# Patient Record
Sex: MALE | Race: OTHER | HISPANIC OR LATINO | Employment: UNEMPLOYED | URBAN - METROPOLITAN AREA
[De-identification: names, ages, dates, MRNs, and addresses within clinical notes are randomized per-mention and may not be internally consistent; named-entity substitution may affect disease eponyms.]

---

## 2023-05-22 ENCOUNTER — HOSPITAL ENCOUNTER (EMERGENCY)
Facility: HOSPITAL | Age: 53
Discharge: HOME | End: 2023-05-22
Attending: INTERNAL MEDICINE

## 2023-05-22 ENCOUNTER — APPOINTMENT (EMERGENCY)
Dept: RADIOLOGY | Facility: HOSPITAL | Age: 53
End: 2023-05-22

## 2023-05-22 VITALS
OXYGEN SATURATION: 100 % | WEIGHT: 201.5 LBS | HEART RATE: 74 BPM | SYSTOLIC BLOOD PRESSURE: 142 MMHG | RESPIRATION RATE: 20 BRPM | DIASTOLIC BLOOD PRESSURE: 87 MMHG | TEMPERATURE: 98.1 F

## 2023-05-22 DIAGNOSIS — M17.12 ARTHRITIS OF LEFT KNEE: Primary | ICD-10-CM

## 2023-05-22 RX ORDER — SENNOSIDES 8.6 MG
650 CAPSULE ORAL EVERY 8 HOURS PRN
Qty: 21 TABLET | Refills: 0 | Status: SHIPPED | OUTPATIENT
Start: 2023-05-22 | End: 2023-05-29

## 2023-05-22 RX ORDER — NAPROXEN 500 MG/1
500 TABLET ORAL 2 TIMES DAILY WITH MEALS
Qty: 14 TABLET | Refills: 0 | Status: SHIPPED | OUTPATIENT
Start: 2023-05-22 | End: 2023-05-29

## 2023-05-22 NOTE — ED ATTENDING ATTESTATION
5/22/2023  ILibby MD, saw and evaluated the patient  I have discussed the patient with the resident/non-physician practitioner and agree with the resident's/non-physician practitioner's findings, Plan of Care, and MDM as documented in the resident's/non-physician practitioner's note, except where noted  All available labs and Radiology studies were reviewed  I was present for key portions of any procedure(s) performed by the resident/non-physician practitioner and I was immediately available to provide assistance  At this point I agree with the current assessment done in the Emergency Department  I have conducted an independent evaluation of this patient a history and physical is as follows:    ED Course   70-year-old male presents to ED for evaluation of left knee pain  Pain has been present for a few weeks  Worse with prolonged standing or walking  No falls or trauma  Has not tried any medications or therapies  Needs a note stating he cannot walk far distances  No fevers, no redness  On exam the patient is awake, alert, and comfortable  Mucous membranes are moist   Neck supple and nontender  The patient's heart is regular without murmurs, rubs, or gallops  Lungs are clear bilaterally with good air movement  Abdomen soft, no voluntary guarding, rebound or rigidity  Moves all extremities  Left knee with full ROM, no effusion, no crepitus, no erythema  Patient neurologically nonfocal  No skin rashes  Back without deformities  Medical decision making: Differential diagnosis includes osteoarthritis, tendon injury, ligament injury, knee effusion, soft tissue injury, will obtain x-ray to rule out acute fracture or dislocation    Discussed supportive treatment      Critical Care Time  Procedures

## 2023-05-22 NOTE — ED PROVIDER NOTES
History  Chief Complaint   Patient presents with   • Knee Pain     Left knee  He is in [employer] and he is required to walk and do projects  His leg got worse and he uses his cane  Needs a note for limitations  76-year-old male with history of substance abuse, left-sided osteoarthritis of his knee presented to emergency department for evaluation of knee pain  Patient reports that he is currently at a rehab facility  This rehab facility makes him do multiple tasks requiring moving around  Pt reports that for at least 1 wk he has had worsening L knee pain  He denies fevers, redness, posterior leg pain  He knows he has a hx of arthritis and is presenting requesting a note telling his facility that he doesn't need to perform the tasks they have been asking him to  Patient is otherwise well and without complaint  None       Past Medical History:   Diagnosis Date   • Stroke [facility])        History reviewed  No pertinent surgical history  History reviewed  No pertinent family history  I have reviewed and agree with the history as documented  E-Cigarette/Vaping     E-Cigarette/Vaping Substances     Social History     Tobacco Use   • Smoking status: Every Day     Packs/day: 1 00     Types: Cigarettes   • Smokeless tobacco: Never   Substance Use Topics   • Alcohol use: Not Currently   • Drug use: Not Currently        Review of Systems   Constitutional: Negative  HENT: Negative  Eyes: Negative  Respiratory: Negative  Cardiovascular: Negative  Gastrointestinal: Negative  Endocrine: Negative  Genitourinary: Negative  Musculoskeletal: Positive for joint swelling  L knee pain   Skin: Negative  Allergic/Immunologic: Negative  Neurological: Negative  Hematological: Negative  Psychiatric/Behavioral: Negative  All other systems reviewed and are negative        Physical Exam  ED Triage Vitals   Temperature Pulse Respirations Blood Pressure SpO2   05/22/23 1000 05/22/23 1000 05/22/23 1000 05/22/23 1000 05/22/23 1000   98 1 °F (36 7 °C) 74 20 142/87 100 %      Temp Source Heart Rate Source Patient Position - Orthostatic VS BP Location FiO2 (%)   05/22/23 1000 05/22/23 1000 05/22/23 1000 05/22/23 1000 --   Oral Monitor Sitting Left arm       Pain Score       05/22/23 1121       8             Orthostatic Vital Signs  Vitals:    05/22/23 1000   BP: 142/87   Pulse: 74   Patient Position - Orthostatic VS: Sitting       Physical Exam  Vitals and nursing note reviewed  Constitutional:       General: He is not in acute distress  Appearance: Normal appearance  He is not ill-appearing, toxic-appearing or diaphoretic  HENT:      Head: Normocephalic and atraumatic  Eyes:      General: No scleral icterus  Right eye: No discharge  Left eye: No discharge  Extraocular Movements: Extraocular movements intact  Conjunctiva/sclera: Conjunctivae normal       Pupils: Pupils are equal, round, and reactive to light  Cardiovascular:      Rate and Rhythm: Normal rate  Pulses: Normal pulses  Heart sounds: Normal heart sounds  No murmur heard  No friction rub  No gallop  Pulmonary:      Effort: Pulmonary effort is normal  No respiratory distress  Breath sounds: Normal breath sounds  No stridor  No wheezing, rhonchi or rales  Abdominal:      General: Abdomen is flat  Bowel sounds are normal  There is no distension  Palpations: Abdomen is soft  Tenderness: There is no abdominal tenderness  There is no guarding or rebound  Musculoskeletal:         General: Tenderness present  No swelling  Normal range of motion  Cervical back: Normal range of motion  No rigidity  Right lower leg: No edema  Left lower leg: No edema  Comments: Minor L knee swelling  Pain w/ ROM, no popliteal pain  No overlying erythema  Skin:     General: Skin is warm and dry  Capillary Refill: Capillary refill takes less than 2 seconds  Coloration: Skin is not jaundiced  Findings: No bruising or lesion  Neurological:      General: No focal deficit present  Mental Status: He is alert and oriented to person, place, and time  Mental status is at baseline  Psychiatric:         Mood and Affect: Mood normal          Behavior: Behavior normal          Thought Content: Thought content normal          Judgment: Judgment normal          ED Medications  Medications - No data to display    Diagnostic Studies  Results Reviewed     None                 XR knee 3 views left non injury   Final Result by Lizett Fairchild MD (05/22 1504)      No acute osseous abnormality  Moderate tricompartmental osteoarthritis as evidenced by medial compartment joint space narrowing, as well as osteophyte formation and subchondral sclerosis  Workstation performed: [id number]               Procedures  Procedures      ED Course                             SBIRT 20yo+    Flowsheet Row Most Recent Value   Initial Alcohol Screen: US AUDIT-C     1  How often do you have a drink containing alcohol? 0 Filed at: 05/22/2023 1002   2  How many drinks containing alcohol do you have on a typical day you are drinking? 0 Filed at: 05/22/2023 1002   3a  Male UNDER 65: How often do you have five or more drinks on one occasion? 0 Filed at: 05/22/2023 1002   3b  FEMALE Any Age, or MALE 65+: How often do you have 4 or more drinks on one occassion? 0 Filed at: 05/22/2023 1002   Audit-C Score 0 Filed at: 05/22/2023 1002   AELXA: How many times in the past year have you    Used an illegal drug or used a prescription medication for non-medical reasons? Never Filed at: 05/22/2023 1002                Medical Decision Making  48-year-old male presenting to the emergency department for evaluation knee pain  Physical exam findings as noted above    Differential for knee pain includes infectious etiology i e  septic arthritis I do not believe this patient has on account of lack of overlying erythema and normal vital signs  X-ray of the knee demonstrates marked joint line narrowing consistent with prior diagnosis of osteoarthritis  Suspect that patient's pain is secondary to arthritis flare  Will recommend anti-inflammatories with Tylenol as needed for a week with orthopedic surgery follow-up  Return precautions given, patient discharged  Amount and/or Complexity of Data Reviewed  Radiology: ordered  Risk  OTC drugs  Prescription drug management  Disposition  Final diagnoses:   Arthritis of left knee     Time reflects when diagnosis was documented in both MDM as applicable and the Disposition within this note     Time User Action Codes Description Comment    5/22/2023 11:06 AM Jorje Mueller Add [M17 12] Arthritis of left knee       ED Disposition     ED Disposition   Discharge    Condition   Stable    Date/Time   Mon May 22, 2023 11:06 AM    Nedra Velasquez 89 discharge to home/self care  Follow-up Information     Follow up With Specialties Details Why Contact Info Additional Information    [address] [facility] Call in 1 day  [address]  [id number]  [address] [place] [address], [id number] [phone]          There are no discharge medications for this patient  PDMP Review     None           ED Provider  Attending physically available and evaluated Marisa Penaloza I managed the patient along with the ED Attending      Electronically Signed by         Marina Echols DO  05/22/23 1660

## 2023-05-22 NOTE — Clinical Note
Kenya Areans was seen and treated in our emergency department on 5/22/2023  No work until cleared by Family Doctor/Orthopedics        Diagnosis:     Cristiano Restrepo    He may return on this date: If you have any questions or concerns, please don't hesitate to call        Scotty Bell, DO    ______________________________           _______________          _______________  Hospital Representative                              Date                                Time

## 2023-06-06 ENCOUNTER — OFFICE VISIT (OUTPATIENT)
Dept: FAMILY MEDICINE CLINIC | Facility: CLINIC | Age: 53
End: 2023-06-06

## 2023-06-06 ENCOUNTER — PATIENT OUTREACH (OUTPATIENT)
Dept: FAMILY MEDICINE CLINIC | Facility: CLINIC | Age: 53
End: 2023-06-06

## 2023-06-06 ENCOUNTER — TELEPHONE (OUTPATIENT)
Dept: PSYCHIATRY | Facility: CLINIC | Age: 53
End: 2023-06-06

## 2023-06-06 VITALS
OXYGEN SATURATION: 97 % | HEIGHT: 69 IN | DIASTOLIC BLOOD PRESSURE: 80 MMHG | SYSTOLIC BLOOD PRESSURE: 140 MMHG | HEART RATE: 78 BPM | RESPIRATION RATE: 16 BRPM | TEMPERATURE: 98.3 F | WEIGHT: 205 LBS | BODY MASS INDEX: 30.36 KG/M2

## 2023-06-06 DIAGNOSIS — Z76.89 ENCOUNTER TO ESTABLISH CARE: Primary | ICD-10-CM

## 2023-06-06 DIAGNOSIS — Z76.0 MEDICATION REFILL: ICD-10-CM

## 2023-06-06 DIAGNOSIS — F25.8 OTHER SCHIZOAFFECTIVE DISORDERS (HCC): ICD-10-CM

## 2023-06-06 DIAGNOSIS — M17.12 ARTHRITIS OF LEFT KNEE: ICD-10-CM

## 2023-06-06 DIAGNOSIS — Z78.9 NEEDS ASSISTANCE WITH COMMUNITY RESOURCES: Primary | ICD-10-CM

## 2023-06-06 DIAGNOSIS — D18.00 CAVERNOUS ANGIOMA: ICD-10-CM

## 2023-06-06 DIAGNOSIS — Z91.89 ENCOUNTER FOR HEPATITIS C VIRUS SCREENING TEST FOR HIGH RISK PATIENT: ICD-10-CM

## 2023-06-06 DIAGNOSIS — I63.9 CEREBROVASCULAR ACCIDENT (CVA), UNSPECIFIED MECHANISM (HCC): ICD-10-CM

## 2023-06-06 DIAGNOSIS — Z11.4 ENCOUNTER FOR SCREENING FOR HIV: ICD-10-CM

## 2023-06-06 DIAGNOSIS — Z59.82 INABILITY TO ACQUIRE TRANSPORTATION: ICD-10-CM

## 2023-06-06 DIAGNOSIS — Z11.59 ENCOUNTER FOR HEPATITIS C VIRUS SCREENING TEST FOR HIGH RISK PATIENT: ICD-10-CM

## 2023-06-06 DIAGNOSIS — R53.1 RIGHT SIDED WEAKNESS: ICD-10-CM

## 2023-06-06 DIAGNOSIS — F11.259 OPIOID DEPENDENCE WITH OPIOID-INDUCED PSYCHOTIC DISORDER WITH COMPLICATION (HCC): ICD-10-CM

## 2023-06-06 DIAGNOSIS — Z74.8 ASSISTANCE WITH TRANSPORTATION: ICD-10-CM

## 2023-06-06 DIAGNOSIS — Z59.9 FINANCIAL DIFFICULTIES: ICD-10-CM

## 2023-06-06 DIAGNOSIS — I10 PRIMARY HYPERTENSION: ICD-10-CM

## 2023-06-06 DIAGNOSIS — Z59.41 FOOD INSECURITY: ICD-10-CM

## 2023-06-06 DIAGNOSIS — E78.2 MIXED HYPERLIPIDEMIA: ICD-10-CM

## 2023-06-06 PROBLEM — M62.82 DISEASE CHARACTERIZED BY DESTRUCTION OF SKELETAL MUSCLE: Status: ACTIVE | Noted: 2023-01-20

## 2023-06-06 PROBLEM — R42 DIZZINESS: Status: ACTIVE | Noted: 2023-06-06

## 2023-06-06 PROBLEM — F11.20 OPIATE DEPENDENCE (HCC): Status: ACTIVE | Noted: 2021-07-11

## 2023-06-06 PROBLEM — R51.9 NOCTURNAL HEADACHES: Status: ACTIVE | Noted: 2022-05-05

## 2023-06-06 PROBLEM — R25.2 SPASM: Status: ACTIVE | Noted: 2022-05-05

## 2023-06-06 PROBLEM — B18.2 HEP C W/O COMA, CHRONIC (HCC): Status: ACTIVE | Noted: 2018-10-04

## 2023-06-06 PROBLEM — K26.5 DUODENAL ULCER PERFORATION (HCC): Status: ACTIVE | Noted: 2020-03-09

## 2023-06-06 PROCEDURE — 99205 OFFICE O/P NEW HI 60 MIN: CPT

## 2023-06-06 RX ORDER — CLONIDINE HYDROCHLORIDE 0.1 MG/1
0.1 TABLET ORAL DAILY
Qty: 90 TABLET | Refills: 0 | Status: SHIPPED | OUTPATIENT
Start: 2023-06-06 | End: 2023-09-04

## 2023-06-06 RX ORDER — HYDROXYZINE 50 MG/1
50 TABLET, FILM COATED ORAL 3 TIMES DAILY PRN
COMMUNITY
End: 2023-06-06 | Stop reason: SDUPTHER

## 2023-06-06 RX ORDER — DOCUSATE SODIUM 100 MG/1
100 CAPSULE, LIQUID FILLED ORAL 2 TIMES DAILY PRN
Qty: 180 CAPSULE | Refills: 0 | Status: SHIPPED | OUTPATIENT
Start: 2023-06-06 | End: 2023-09-04

## 2023-06-06 RX ORDER — DOXEPIN HYDROCHLORIDE 50 MG/1
CAPSULE ORAL
COMMUNITY
Start: 2023-04-20 | End: 2023-06-06 | Stop reason: SDUPTHER

## 2023-06-06 RX ORDER — PROCHLORPERAZINE MALEATE 5 MG/1
5 TABLET ORAL EVERY 6 HOURS PRN
COMMUNITY

## 2023-06-06 RX ORDER — ACETAMINOPHEN 325 MG/1
650 TABLET ORAL EVERY 6 HOURS PRN
Qty: 90 TABLET | Refills: 2 | Status: SHIPPED | OUTPATIENT
Start: 2023-06-06 | End: 2023-09-04

## 2023-06-06 RX ORDER — DICYCLOMINE HYDROCHLORIDE 10 MG/5ML
20 SOLUTION ORAL
COMMUNITY

## 2023-06-06 RX ORDER — ESCITALOPRAM OXALATE 20 MG/1
TABLET ORAL
COMMUNITY
Start: 2023-04-11 | End: 2023-06-06 | Stop reason: SDUPTHER

## 2023-06-06 RX ORDER — HYDROXYZINE 50 MG/1
50 TABLET, FILM COATED ORAL 3 TIMES DAILY PRN
Qty: 90 TABLET | Refills: 0 | Status: SHIPPED | OUTPATIENT
Start: 2023-06-06 | End: 2023-09-04

## 2023-06-06 RX ORDER — ATORVASTATIN CALCIUM 10 MG/1
10 TABLET, FILM COATED ORAL DAILY
Qty: 90 TABLET | Refills: 0 | Status: SHIPPED | OUTPATIENT
Start: 2023-06-06 | End: 2023-09-04

## 2023-06-06 RX ORDER — ASPIRIN 81 MG/1
81 TABLET, COATED ORAL DAILY
Qty: 90 TABLET | Refills: 0 | Status: SHIPPED | OUTPATIENT
Start: 2023-06-06 | End: 2023-09-04

## 2023-06-06 RX ORDER — DOXEPIN HYDROCHLORIDE 50 MG/1
50 CAPSULE ORAL DAILY
Qty: 90 CAPSULE | Refills: 0 | Status: SHIPPED | OUTPATIENT
Start: 2023-06-06 | End: 2023-09-04

## 2023-06-06 RX ORDER — HYDROCHLOROTHIAZIDE 12.5 MG/1
12.5 CAPSULE, GELATIN COATED ORAL DAILY
COMMUNITY
End: 2023-06-06

## 2023-06-06 RX ORDER — FUROSEMIDE 20 MG/1
20 TABLET ORAL 2 TIMES DAILY
COMMUNITY
End: 2023-06-06 | Stop reason: SDUPTHER

## 2023-06-06 RX ORDER — ATORVASTATIN CALCIUM 10 MG/1
TABLET, FILM COATED ORAL
COMMUNITY
Start: 2023-04-20 | End: 2023-06-06 | Stop reason: SDUPTHER

## 2023-06-06 RX ORDER — DOCUSATE SODIUM 100 MG/1
CAPSULE, LIQUID FILLED ORAL
COMMUNITY
Start: 2023-03-01 | End: 2023-06-06 | Stop reason: SDUPTHER

## 2023-06-06 RX ORDER — TRIFLUOPERAZINE HYDROCHLORIDE 2 MG/1
TABLET, FILM COATED ORAL
COMMUNITY
Start: 2023-04-14 | End: 2023-06-06 | Stop reason: SDUPTHER

## 2023-06-06 RX ORDER — QUETIAPINE FUMARATE 200 MG/1
TABLET, FILM COATED ORAL
COMMUNITY
Start: 2023-03-01 | End: 2023-06-06 | Stop reason: SDUPTHER

## 2023-06-06 RX ORDER — CLONIDINE HYDROCHLORIDE 0.1 MG/1
TABLET ORAL
COMMUNITY
Start: 2023-04-18 | End: 2023-06-06 | Stop reason: SDUPTHER

## 2023-06-06 RX ORDER — ACETAMINOPHEN 325 MG/1
650 TABLET ORAL EVERY 6 HOURS PRN
COMMUNITY
End: 2023-06-06 | Stop reason: SDUPTHER

## 2023-06-06 RX ORDER — DIVALPROEX SODIUM 500 MG/1
500 TABLET, DELAYED RELEASE ORAL EVERY 8 HOURS SCHEDULED
COMMUNITY

## 2023-06-06 RX ORDER — ESCITALOPRAM OXALATE 20 MG/1
20 TABLET ORAL DAILY
Qty: 90 TABLET | Refills: 0 | Status: SHIPPED | OUTPATIENT
Start: 2023-06-06 | End: 2023-09-04

## 2023-06-06 RX ORDER — QUETIAPINE FUMARATE 200 MG/1
200 TABLET, FILM COATED ORAL DAILY
Qty: 90 TABLET | Refills: 0 | Status: SHIPPED | OUTPATIENT
Start: 2023-06-06 | End: 2023-09-04

## 2023-06-06 RX ORDER — NALOXONE HYDROCHLORIDE 4 MG/.1ML
1 SPRAY NASAL
Qty: 2 EACH | Refills: 2 | Status: SHIPPED | OUTPATIENT
Start: 2023-06-06

## 2023-06-06 RX ORDER — DIVALPROEX SODIUM 500 MG/1
500 TABLET, EXTENDED RELEASE ORAL DAILY
Qty: 90 TABLET | Refills: 0 | Status: SHIPPED | OUTPATIENT
Start: 2023-06-06 | End: 2023-09-04

## 2023-06-06 RX ORDER — ASPIRIN 81 MG/1
TABLET, COATED ORAL
COMMUNITY
Start: 2023-04-11 | End: 2023-06-06 | Stop reason: SDUPTHER

## 2023-06-06 RX ORDER — HYDROCHLOROTHIAZIDE 12.5 MG/1
12.5 CAPSULE, GELATIN COATED ORAL DAILY
Qty: 90 CAPSULE | Refills: 0 | Status: SHIPPED | OUTPATIENT
Start: 2023-06-06 | End: 2023-09-04

## 2023-06-06 RX ORDER — DIVALPROEX SODIUM 500 MG/1
TABLET, EXTENDED RELEASE ORAL
COMMUNITY
Start: 2023-05-01 | End: 2023-06-06 | Stop reason: SDUPTHER

## 2023-06-06 RX ORDER — QUETIAPINE FUMARATE 300 MG/1
TABLET, FILM COATED ORAL
COMMUNITY
Start: 2023-04-28 | End: 2023-06-06 | Stop reason: SDUPTHER

## 2023-06-06 RX ORDER — IBUPROFEN 600 MG/1
TABLET ORAL EVERY 6 HOURS PRN
COMMUNITY

## 2023-06-06 RX ORDER — FUROSEMIDE 20 MG/1
20 TABLET ORAL 2 TIMES DAILY
Qty: 180 TABLET | Refills: 0 | Status: SHIPPED | OUTPATIENT
Start: 2023-06-06 | End: 2023-09-04

## 2023-06-06 RX ORDER — QUETIAPINE FUMARATE 300 MG/1
300 TABLET, FILM COATED ORAL
Qty: 90 TABLET | Refills: 0 | Status: SHIPPED | OUTPATIENT
Start: 2023-06-06 | End: 2023-09-04

## 2023-06-06 RX ORDER — NALOXONE HYDROCHLORIDE 4 MG/.1ML
1 SPRAY NASAL
COMMUNITY
Start: 2023-01-22 | End: 2023-06-06 | Stop reason: SDUPTHER

## 2023-06-06 RX ORDER — TRIFLUOPERAZINE HYDROCHLORIDE 2 MG/1
2 TABLET, FILM COATED ORAL DAILY
Qty: 90 TABLET | Refills: 0 | Status: SHIPPED | OUTPATIENT
Start: 2023-06-06 | End: 2023-09-04

## 2023-06-06 RX ORDER — HYDROCHLOROTHIAZIDE 12.5 MG/1
CAPSULE, GELATIN COATED ORAL
COMMUNITY
Start: 2023-04-23 | End: 2023-06-06 | Stop reason: SDUPTHER

## 2023-06-06 SDOH — ECONOMIC STABILITY - INCOME SECURITY: PROBLEM RELATED TO HOUSING AND ECONOMIC CIRCUMSTANCES, UNSPECIFIED: Z59.9

## 2023-06-06 SDOH — ECONOMIC STABILITY - TRANSPORTATION SECURITY: TRANSPORTATION INSECURITY: Z59.82

## 2023-06-06 SDOH — ECONOMIC STABILITY - FOOD INSECURITY: FOOD INSECURITY: Z59.41

## 2023-06-06 NOTE — ASSESSMENT & PLAN NOTE
Ct brain 1/2023  FINDINGS:   BRAIN PARENCHYMA: No acute hemorrhage  No mass effect or herniation  Gray-white differentiation is maintained  White matter is within normal limits for age  Redemonstration of partially calcified 7 mm lesion in the left hypothalamus previously   demonstrated to be a cavernoma  VENTRICLES/EXTRA-AXIAL SPACES: No hydrocephalus or extra-axial fluid collections  EXTRACRANIAL STRUCTURES: Normal bones and soft tissues  Visualized paranasal sinuses and mastoids are clear  IMPRESSION:     1  No acute intracranial abnormality  2  Stable left hypothalamic mass consistent with cavernoma on prior imaging studies        amb ref to neurology - future

## 2023-06-06 NOTE — PROGRESS NOTES
Name: Alix Mendoza      : 1970      MRN: [id number]  Encounter Provider: ROHINI Russell  Encounter Date: 2023   Encounter department: [address] [place] [address]     1  Encounter to establish care    2  Financial difficulties  Comments:  homeless  Assessment & Plan:  Referral placed for social work- which spoke to patient prior to discharge to provided patient with resources  Orders:  -     Ambulatory referral to social work care management program; Future; Expected date: 2023  -     Ambulatory Referral to Social Work Care Management Program; Future    3  Inability to acquire transportation  -     Ambulatory referral to social work care management program; Future; Expected date: 2023    4  Food insecurity  -     Ambulatory referral to social work care management program; Future; Expected date: 2023  -     Ambulatory Referral to Social Work Care Management Program; Future    5  Cerebrovascular accident (CVA), unspecified mechanism ([facility] [address] )  Assessment & Plan:  History of stroke with residual right sided weakness, reports following with neurology last visit two months ago  MRI 2021  FINDINGS: Ventricles and sulci are mildly prominent in keeping with   mild cerebral volume loss  There is mild periventricular T-2/flair   white matter hypoattenuation as well as a few punctate scattered foci   of hyperintensity in the right frontal white matter which are   nonspecific  No extra axial collection  No mass effect or edema   No   acute infarction seen on the diffusion sequence   No evidence of   hemorrhage on the susceptibility sequence  Again seen is a T1/T2   hypointense focus with associated susceptibility artifact along the   inferomedial margin of the left hypothalamus, consistent with   previously reported cavernous malformation  The major vessel flow voids are preserved at the skull base     Cerebellar tonsils are in normal location  The sella is not expanded  No gross orbital mass  Visualized paranasal sinuses and mastoids are   normal in signal  Bone marrow signal is unremarkable  IMPRESSION:     1   No intracranial mass effect, parenchymal hemorrhage, or evidence   of acute infarct  2   Stable appearance of left hypothalamic cavernous malformation  Continue with ASA   Amb ref to neurology - future  Orders:  -     Aspirin Low Dose 81 MG EC tablet; Take 1 tablet (81 mg total) by mouth daily  -     Ambulatory Referral to Neurology; Future    6  Arthritis of left knee  -     acetaminophen (TYLENOL) 325 mg tablet; Take 2 tablets (650 mg total) by mouth every 6 (six) hours as needed for severe pain  -     diclofenac sodium (VOLTAREN) 50 mg EC tablet; Take 1 tablet (50 mg total) by mouth 2 (two) times a day    7  Medication refill  -     docusate sodium (COLACE) 100 mg capsule; Take 1 capsule (100 mg total) by mouth 2 (two) times a day as needed for constipation    8  BMI 30 0-30 9,adult  -     CBC and differential; Future  -     Comprehensive metabolic panel; Future  -     Hemoglobin A1C; Future  -     Lipid panel; Future  -     TSH, 3rd generation with Free T4 reflex; Future  -     Ambulatory Referral to Cardiology; Future  -     CBC and differential  -     Comprehensive metabolic panel  -     Lipid panel  -     TSH, 3rd generation with Free T4 reflex    9  Opioid dependence with opioid-induced psychotic disorder with complication [facility])  Assessment & Plan:  Patient denies current use, currently staying at [facility] unsure for how long   amb ref for addiction services     Orders:  -     naloxone (NARCAN) 4 mg/0 1 mL nasal spray; 0 1 mL (4 mg total) into each nostril every 3 (three) minutes as needed for opioid reversal or respiratory depression  -     Ambulatory referral to Addiction Services; Future    10   Other schizoaffective disorders ([facility] [address] )  Assessment & Plan:  Patient on multiple psychiatric medications patient unsure of medications he is on   amb ref to psych - future    Orders:  -     trifluoperazine (STELAZINE) 2 mg tablet; Take 1 tablet (2 mg total) by mouth in the morning  -     QUEtiapine (SEROquel) 300 mg tablet; Take 1 tablet (300 mg total) by mouth daily at bedtime  -     QUEtiapine (SEROquel) 200 mg tablet; Take 1 tablet (200 mg total) by mouth in the morning  -     hydrOXYzine HCL (ATARAX) 50 mg tablet; Take 1 tablet (50 mg total) by mouth 3 (three) times a day as needed for anxiety  -     escitalopram (LEXAPRO) 20 mg tablet; Take 1 tablet (20 mg total) by mouth daily  -     doxepin (SINEquan) 50 mg capsule; Take 1 capsule (50 mg total) by mouth in the morning  -     divalproex sodium (DEPAKOTE ER) 500 mg 24 hr tablet; Take 1 tablet (500 mg total) by mouth daily  -     cloNIDine (CATAPRES) 0 1 mg tablet; Take 1 tablet (0 1 mg total) by mouth in the morning  -     Ambulatory referral to Addiction Services; Future  -     Ambulatory Referral to Psychiatry; Future    11  Encounter for screening for HIV  -     : HIV 1/2 AB/AG w Reflex [facility] for 2 yr old and above; Future    12  Encounter for hepatitis C virus screening test for high risk patient  -     Hepatitis C antibody; Future    13  Right sided weakness  Assessment & Plan:  Was receiving PT in [place]  Referral to pt/ot for evaluation and further therapy     Orders:  -     Ambulatory Referral to PT/OT Functional Capacity Evaluation; Future    14  Cavernous angioma  Assessment & Plan:  Ct brain 1/2023  FINDINGS:   BRAIN PARENCHYMA: No acute hemorrhage  No mass effect or herniation  Gray-white differentiation is maintained  White matter is within normal limits for age  Redemonstration of partially calcified 7 mm lesion in the left hypothalamus previously   demonstrated to be a cavernoma  VENTRICLES/EXTRA-AXIAL SPACES: No hydrocephalus or extra-axial fluid collections  EXTRACRANIAL STRUCTURES: Normal bones and soft tissues   Visualized paranasal sinuses and mastoids are clear  IMPRESSION:     1  No acute intracranial abnormality  2  Stable left hypothalamic mass consistent with cavernoma on prior imaging studies  amb ref to neurology - future    Orders:  -     Ambulatory Referral to Neurology; Future    15  Primary hypertension  Assessment & Plan:  BP Readings from Last 3 Encounters:   06/06/23 140/80   05/22/23 142/87     - Blood pressure currently well controlled with current antihypertensive therapy Lasix 20 mg bid, hydrochlorothiazide 12 5   - No complaints of adverse effects, including visual changes, dizziness, headaches or syncope  - continue current therapy  - Encouraged continued use of home blood pressure logs  - Encouraged diet and exercise regimen as tolerated  -ECG-future   amb ref to cardiology      Orders:  -     hydrochlorothiazide (MICROZIDE) 12 5 mg capsule; Take 1 capsule (12 5 mg total) by mouth in the morning  -     furosemide (LASIX) 20 mg tablet; Take 1 tablet (20 mg total) by mouth 2 (two) times a day  -     cloNIDine (CATAPRES) 0 1 mg tablet; Take 1 tablet (0 1 mg total) by mouth in the morning  -     ECG 12 lead; Future    16  Mixed hyperlipidemia  -     atorvastatin (LIPITOR) 10 mg tablet; Take 1 tablet (10 mg total) by mouth daily         Subjective      Lesa Huynh 46 y o  male  has a past medical history of Cavernous angioma (5/5/2022), 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 (1/20/2023), Duodenal ulcer perforation ([facility] [address] ) (3/9/2020), Hep C w/o coma, chronic ([facility] [address] ) (10/4/2018), Nocturnal headaches (5/5/2022), Opiate dependence ([facility] [address] ) (7/11/2021), Other schizoaffective disorders ([facility] [address] ) (6/6/2023), Right sided weakness (12/17/2021), Spasm (5/5/2022), and Stroke ([facility] [address] )  Presenting today to establish care  Patient recently moved to the area from [address]; patient was being follow at [facility] system  Per chart review frequent hospital admission for opoid overdose   Currently staying at [facility]; but reports due to right sided weakness from prior strokes he is unable to contribute at the facility and they are considering discharging him from the program  Patient currently denies use of opioids; however not on any therapy or pharmacological treatments  Patient requesting assistance to obtain housing and disability benefits would like social work assistance  Denies fatigue, headaches, dizziness, blurred vision, nausea, palpitation, chest pain, SOB, urinary changes, weakness, bowel changes, sleep problems,  sick contacts, red flag signs,  or recent travel   Overall patient reports feeling well   Patient has no further complaints other than what is mentioned in the ROS  In preparation for this visit all prior office notes, prior consultations, emergency room visits, blood work results, and imaging studies were personally reviewed   A total of 5 minutes was spent reviewing all of this information  Knee Pain   The incident occurred more than 1 week ago (chronic)  Incident location: unclear  There was no injury mechanism  The pain is present in the left knee  The quality of the pain is described as aching  The pain is at a severity of 0/10  The patient is experiencing no pain  The pain has been fluctuating since onset  Associated symptoms include an inability to bear weight and muscle weakness  Pertinent negatives include no loss of motion, loss of sensation, numbness or tingling  He reports no foreign bodies present  The symptoms are aggravated by movement and weight bearing  He has tried acetaminophen and NSAIDs for the symptoms  The treatment provided mild relief  Review of Systems   Constitutional: Negative for chills and fever  HENT: Negative for ear pain and sore throat  Eyes: Negative for pain and visual disturbance  Respiratory: Negative for cough and shortness of breath  Cardiovascular: Negative for chest pain and palpitations     Gastrointestinal: Negative for abdominal pain and vomiting  Genitourinary: Negative for dysuria and hematuria  Musculoskeletal: Positive for arthralgias (chronic)  Negative for back pain  Skin: Negative for color change and rash  Neurological: Negative for tingling, seizures, syncope and numbness  All other systems reviewed and are negative        Current Outpatient Medications on File Prior to Visit   Medication Sig   • dicyclomine (BENTYL) 10 mg/5 mL oral solution Take 20 mg by mouth 4 (four) times a day (before meals and at bedtime)   • divalproex sodium (DEPAKOTE) 500 mg DR tablet Take 500 mg by mouth every 8 (eight) hours   • ibuprofen (MOTRIN) 600 mg tablet Take by mouth every 6 (six) hours as needed   • prochlorperazine (COMPAZINE) 5 mg tablet Take 5 mg by mouth every 6 (six) hours as needed   • [DISCONTINUED] acetaminophen (TYLENOL) 325 mg tablet Take 650 mg by mouth every 6 (six) hours as needed   • [DISCONTINUED] furosemide (LASIX) 20 mg tablet Take 20 mg by mouth 2 (two) times a day   • [DISCONTINUED] hydrochlorothiazide (MICROZIDE) 12 5 mg capsule Take 12 5 mg by mouth daily   • [DISCONTINUED] hydrOXYzine HCL (ATARAX) 50 mg tablet Take 50 mg by mouth 3 (three) times a day as needed   • [DISCONTINUED] naloxone (NARCAN) 4 mg/0 1 mL nasal spray 1 spray into each nostril   • [DISCONTINUED] Aspirin Low Dose 81 MG EC tablet    • [DISCONTINUED] atorvastatin (LIPITOR) 10 mg tablet    • [DISCONTINUED] cloNIDine (CATAPRES) 0 1 mg tablet    • [DISCONTINUED] divalproex sodium (DEPAKOTE ER) 500 mg 24 hr tablet    • [DISCONTINUED] docusate sodium (COLACE) 100 mg capsule    • [DISCONTINUED] doxepin (SINEquan) 50 mg capsule    • [DISCONTINUED] escitalopram (LEXAPRO) 20 mg tablet    • [DISCONTINUED] hydrochlorothiazide (MICROZIDE) 12 5 mg capsule    • [DISCONTINUED] naproxen (EC NAPROSYN) 500 MG EC tablet Take 1 tablet (500 mg total) by mouth 2 (two) times a day with meals for 7 days   • [DISCONTINUED] QUEtiapine (SEROquel) 200 mg tablet    • [DISCONTINUED] "QUEtiapine (SEROquel) 300 mg tablet    • [DISCONTINUED] trifluoperazine (STELAZINE) 2 mg tablet        Objective     /80 (BP Location: Right arm, Patient Position: Sitting, Cuff Size: Standard)   Pulse 78   Temp 98 3 °F (36 8 °C) (Temporal)   Resp 16   Ht 5' 9\" (1 753 m)   Wt 93 kg (205 lb)   SpO2 97%   BMI 30 27 kg/m²     Physical Exam  Vitals and nursing note reviewed  Constitutional:       General: He is not in acute distress  Appearance: Normal appearance  He is not ill-appearing  HENT:      Head: Normocephalic and atraumatic  Right Ear: External ear normal       Left Ear: External ear normal       Nose: Nose normal       Mouth/Throat:      Mouth: Mucous membranes are moist    Eyes:      General:         Right eye: No discharge  Left eye: No discharge  Pupils: Pupils are equal, round, and reactive to light  Cardiovascular:      Rate and Rhythm: Normal rate and regular rhythm  Pulses: Normal pulses  Heart sounds: Normal heart sounds  Pulmonary:      Effort: Pulmonary effort is normal  No respiratory distress  Breath sounds: Normal breath sounds  No wheezing  Abdominal:      General: Bowel sounds are normal       Palpations: Abdomen is soft  Tenderness: There is no abdominal tenderness  There is no right CVA tenderness or left CVA tenderness  Musculoskeletal:      Cervical back: Normal range of motion  Right knee: Normal       Left knee: Bony tenderness present  No swelling  Decreased range of motion  Skin:     General: Skin is warm and dry  Neurological:      General: No focal deficit present  Mental Status: He is alert and oriented to person, place, and time       I have spent a total time of 40 minutes on 06/06/23 in caring for this patient including Prognosis, Instructions for management, Patient and family education, Importance of tx compliance, Risk factor reductions, Impressions, Counseling / Coordination of care, Documenting in " the medical record, Reviewing / ordering tests, medicine, procedures  , Obtaining or reviewing history   and Communicating with other healthcare professionals         ROHINI Ledesma

## 2023-06-06 NOTE — ASSESSMENT & PLAN NOTE
Patient on multiple psychiatric medications patient unsure of medications he is on   amb ref to psych - future

## 2023-06-06 NOTE — ASSESSMENT & PLAN NOTE
History of stroke with residual right sided weakness, reports following with neurology last visit two months ago  MRI 7/2021  FINDINGS: Ventricles and sulci are mildly prominent in keeping with   mild cerebral volume loss  There is mild periventricular T-2/flair   white matter hypoattenuation as well as a few punctate scattered foci   of hyperintensity in the right frontal white matter which are   nonspecific  No extra axial collection  No mass effect or edema   No   acute infarction seen on the diffusion sequence   No evidence of   hemorrhage on the susceptibility sequence  Again seen is a T1/T2   hypointense focus with associated susceptibility artifact along the   inferomedial margin of the left hypothalamus, consistent with   previously reported cavernous malformation  The major vessel flow voids are preserved at the skull base  Cerebellar tonsils are in normal location  The sella is not expanded  No gross orbital mass  Visualized paranasal sinuses and mastoids are   normal in signal  Bone marrow signal is unremarkable  IMPRESSION:     1   No intracranial mass effect, parenchymal hemorrhage, or evidence   of acute infarct  2   Stable appearance of left hypothalamic cavernous malformation  Continue with ASA   Amb ref to neurology - future

## 2023-06-06 NOTE — PROGRESS NOTES
JOAQUÍN MARTE received in-person consult from Provider, SELENA  [address] [place] [address] regarding pt is asking for assistance for housing  Pt moved from [place] and is living at  [facility]  Pt without a phone  JOAQUÍN MARTE met with pt, introduced [place]  Pt reports she was in a rehab for 7 months in [place] and when he was d/c he didn't have stable housing in [facility] and the only place that has a bed available was [facility] in [facility]  Pt reports he has been staying at [facility] for over a month now but he would like to get his own place  Pt states due to his medical coordinations is very difficult for him to help out with the obligations at [facility]  Pt states he had stroke and need assistance to walk therefore,he cannot be in the street selling the desert[place], which is a requirement for Arnaldo YANES CM inquire about income or benefits  Pt states he don't have any source of income  He applied for SSD in [place] but he is not sure of the status of the application  JOAQUÍN MARTE suggested to [facility] or go in-person to check the status or he if has to apply again  Informs pt the [facility] is across the street from [facility]  Pt verbalized understanding  Explained to pt without income he would not be able to apply for rental assistance for find housing  Explained he can apply for Housing but he could be in the wait list for  To 5 years  Suggested to get a medical note stating he cannot be walking so [facility] could accommodate him  Informs pt if he leaves  he would has to go to a shelter, witch it could be worse for him as he would not be allows to stay in the shelter during the day   Pt verbalized understanding  Pt reports he received SNAP benefit  Pt reports he need to switch his insurance as he still has the insurance from [place]  Pt reports he don't have a phone but he could be  contact it at the [facility] phone  Pt don't have transportation and is difficult for him to walk     Informs pt a [facility] referral would be place to assist him apply for a phone and to apply for MA  Informs pt after he gets approved for MA he can also apply for [facility] services  Pt verbalized understanding  Pt reports he has been clean for 9 months  Pt has a hx of opiod abus  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  Pr also reports he has bipolar disorder and schizoaffective disorder  Pt reports he was going to treatment in [place] but he haven't establish care here yet  A referral to [facility] was placed  JOAQUÍN MARTE explained what [facility] is and where is located  JOAQUÍN MARTE offered to called [facility] to schedule an intake appointment  JOAQUÍN MARTE placed call to [facility] and scheduled an intake appointment for 6/12/23 at 10am with ESTUARDO YANES CM was informs there is a wait list to be seen by the Psych and they are not doing MAT treatment at this time  Pt states he is interested in therapy at this time and he is getting his meds described by his PCP  Pt also received referrals for Cardiology, Neurology, PT and for an EKG  Pt states he can call to schedule the appointments  JOAQUÍN MARTE asked the referral speciaist for the phone number so pt can call  Referral specialists provided the phone numbers for each office to schedule the appointments  Informs pt if he need help scheduling the appointment to call JOAQUÍN MARTE  Pt verbalized understanding  JOAQUÍN MARTE placed [facility] referral to assist pt apply for MA, a free phone, ana paula and Zina Vaughn  and will remains available to continue to follow-up

## 2023-06-06 NOTE — ASSESSMENT & PLAN NOTE
Referral placed for social work- which spoke to patient prior to discharge to provided patient with resources

## 2023-06-06 NOTE — TELEPHONE ENCOUNTER
[facility] Intake Questions       Referred by: PCP    Please advise interviewee that they need to answer all questions truthfully to allow for best care and any misrepresentations of information may affect their ability to be seen at this clinic     Was this discussed? Yes      [facility] Intake History -        Presenting Problem (in patient's words): In need of therapy and Psychiatry  They are aware of the wait list  Per Una (CM), he has not used in 9 months  Not interested in MAT  Are there any developmental disabilities? No    Does the patient have a language barrier or hearing impairment? No       Substance Use-       1  Are you being referred for treatment of any of the following: alcohol, benzodiazepines, baclofen, GHB, phenibut, or Soma? No    2  Are you being referred for the treatment of opioid use? Yes    3  For what substance use are you being referred to the [address] [place] [address]? Heroin and cocaine       Psychiatric Care-        Do you have a psychiatric diagnosis? Schizoeffective, bi-polar and depression    2  Are you taking any psychiatric medications? Hydroxyzine, Seroquel,     3  Have you been treated at [facility] by a therapist or a doctor in the past? If yes, who? No       4  Are you currently having thoughts of harming yourself or others? No    5  Have you been hospitalized for mental health? Yes, recently in [place]  6  Do you have a community treatment team or ? Yes - CM through [facility]      Legal History-         Do you have any history of arrests, care home/skilled nursing time, or DUIs? No    Prenatal/         Are you pregnant? N/A       Have you given birth in the last 28 days?  N/A       Intake Team, please check with provider before scheduling if flags come up such as:     - complex case     - legal history (other than DUI)     - communication barrier concerns are present     - already being prescribed buprenorphine or methadone     - if, in your judgment, this needs further review        ACCEPTED as a patient Yes  Appointment Date: 06/12      Referred Elsewhere?  No         Name of Insurance Co:     Insurance ID#     [employer] #     If ins is primary or secondary

## 2023-06-06 NOTE — ASSESSMENT & PLAN NOTE
Patient denies current use, currently staying at [facility] unsure for how long   amb ref for addiction services

## 2023-06-06 NOTE — ASSESSMENT & PLAN NOTE
BP Readings from Last 3 Encounters:   06/06/23 140/80   05/22/23 142/87     - Blood pressure currently well controlled with current antihypertensive therapy Lasix 20 mg bid, hydrochlorothiazide 12 5   - No complaints of adverse effects, including visual changes, dizziness, headaches or syncope  - continue current therapy  - Encouraged continued use of home blood pressure logs  - Encouraged diet and exercise regimen as tolerated    -ECG-future   amb ref to cardiology

## 2023-06-07 ENCOUNTER — PATIENT OUTREACH (OUTPATIENT)
Dept: FAMILY MEDICINE CLINIC | Facility: CLINIC | Age: 53
End: 2023-06-07

## 2023-06-07 NOTE — PROGRESS NOTES
Outgoing Call:  6/7/2023    Chart reviewed  Referral received for interest in [facility], [place], [place], and phone  [address] called pt at [facility]- , spoke with Lee Rosa who informed pt was in his counseling session and unable to speak with [address]  [address] provided number to Lee Rosa and requested a call in return from pt  Letter sent  Next outreach is scheduled for 6/9/2023  Incoming Call:  Lazaro Clarke received return call from pt  [address] introduced herself and her role  Pt agreed to services and agreed to meet next week  CHW assessment to be completed at that time       Next outreach is scheduled for 6/12/203 at [address] [place], [place] [address]d [facility]

## 2023-06-07 NOTE — LETTER
06/07/23    Estimado/a Andrew Graham trabajador comunitario de la bri de [facility]  [facility]  [address] [address]    Intenté comunicarme con usted por teléfono  Es importante que me llame al [phone] para que pueda ofrecerle ayuda con sixto necesidades de [employer]  Atentamente           Sonny Mills, [facility]

## 2023-06-12 ENCOUNTER — DOCUMENTATION (OUTPATIENT)
Dept: PSYCHIATRY | Facility: CLINIC | Age: 53
End: 2023-06-12

## 2023-06-12 ENCOUNTER — PATIENT OUTREACH (OUTPATIENT)
Dept: FAMILY MEDICINE CLINIC | Facility: CLINIC | Age: 53
End: 2023-06-12

## 2023-06-12 NOTE — PROGRESS NOTES
In Person:  6/12/2023    [facility] did meet with pt at [facility] for scheduled appointment  CHW assessment was completed today  [facility] updated pt's [place] issued ID via Jeremy  Copy provided to pt  [facility] assisted pt in calling [employer] to request his MA and SNAP benefits be transferred to [facility] as he is currently enrolled with [place]  After being placed on hold for 35 minutes pt was unable to identify his previous address with DPW and they refused to move further with completing a transfer of benefits  CMOC completed a new application and scanned to local [facility] completed a housing application for pt with [facility]  Pt is not eligible for any other housing programs due to age and not being disabled  Pt is aware wait period can take a few years  CMOC informed pt he will need to call SSA directly or go in person to apply for SSI benefits  Pt lives one block away from his local [address] office and is capable of going in person  Pt informed he will go sometime this week  CMOC to meet with pt later this week to reach out to [employer] about his MA/SNAP application  [facility] informed pt once his MA is transferred we can request Juanjose  services and free phone  Pt expressed understanding       Next outreach is scheduled for 6/16/2023 at [email or web address] at [facility]

## 2023-06-14 ENCOUNTER — PROCEDURE VISIT (OUTPATIENT)
Dept: FAMILY MEDICINE CLINIC | Facility: CLINIC | Age: 53
End: 2023-06-14

## 2023-06-14 VITALS
RESPIRATION RATE: 18 BRPM | BODY MASS INDEX: 30.07 KG/M2 | OXYGEN SATURATION: 98 % | DIASTOLIC BLOOD PRESSURE: 82 MMHG | TEMPERATURE: 98 F | HEART RATE: 76 BPM | WEIGHT: 203 LBS | HEIGHT: 69 IN | SYSTOLIC BLOOD PRESSURE: 124 MMHG

## 2023-06-14 DIAGNOSIS — M17.12 ARTHRITIS OF LEFT KNEE: Primary | ICD-10-CM

## 2023-06-14 PROCEDURE — 20610 DRAIN/INJ JOINT/BURSA W/O US: CPT | Performed by: FAMILY MEDICINE

## 2023-06-14 RX ORDER — TRIAMCINOLONE ACETONIDE 40 MG/ML
40 INJECTION, SUSPENSION INTRA-ARTICULAR; INTRAMUSCULAR
Status: COMPLETED | OUTPATIENT
Start: 2023-06-14 | End: 2023-06-14

## 2023-06-14 RX ORDER — BUPIVACAINE HYDROCHLORIDE 5 MG/ML
2 INJECTION, SOLUTION EPIDURAL; INTRACAUDAL
Status: COMPLETED | OUTPATIENT
Start: 2023-06-14 | End: 2023-06-14

## 2023-06-14 RX ORDER — BUPIVACAINE HYDROCHLORIDE 5 MG/ML
3.5 INJECTION, SOLUTION PERINEURAL
Status: DISCONTINUED | OUTPATIENT
Start: 2023-06-14 | End: 2023-06-14

## 2023-06-14 RX ADMIN — TRIAMCINOLONE ACETONIDE 40 MG: 40 INJECTION, SUSPENSION INTRA-ARTICULAR; INTRAMUSCULAR at 09:00

## 2023-06-14 RX ADMIN — BUPIVACAINE HYDROCHLORIDE 2 ML: 5 INJECTION, SOLUTION EPIDURAL; INTRACAUDAL at 09:00

## 2023-06-14 NOTE — ASSESSMENT & PLAN NOTE
Procedure well tolerated with no complications or adverse reactions to medications noted during or immediately afterwards  Explained to patient that relieve the pain can be expected immediately with possible return after wearing off lidocaine and then within the next 24 hours better relief with onset of steroid  Signs of infection explained and recommended to call if these develop  Patient should follow-up with physical therapy  Referral placed

## 2023-06-14 NOTE — PROGRESS NOTES
Large joint arthrocentesis: L knee  Universal Protocol:  Consent: Verbal consent obtained  Risks and benefits: risks, benefits and alternatives were discussed  Consent given by: patient  Patient understanding: patient states understanding of the procedure being performed  Patient consent: the patient's understanding of the procedure matches consent given  Procedure consent: procedure consent matches procedure scheduled  Relevant documents: relevant documents present and verified  Site marked: the operative site was marked  Radiology Images displayed and confirmed  If images not available, report reviewed: imaging studies available  Patient identity confirmed: verbally with patient    Supporting Documentation  Indications: pain and joint swelling   Procedure Details  Location: knee - L knee  Preparation: Patient was prepped and draped in the usual sterile fashion  Needle size: 22 G  Ultrasound guidance: no  Approach: anterolateral  Medications administered: 40 mg triamcinolone acetonide 40 mg/mL; 2 mL bupivacaine (PF) 0 5 %    Aspirate amount: 0 mL    Patient tolerance: patient tolerated the procedure well with no immediate complications  Dressing:  Sterile dressing applied        1  Arthritis of left knee  Assessment & Plan:  Procedure well tolerated with no complications or adverse reactions to medications noted during or immediately afterwards  Explained to patient that relieve the pain can be expected immediately with possible return after wearing off lidocaine and then within the next 24 hours better relief with onset of steroid  Signs of infection explained and recommended to call if these develop  Patient should follow-up with physical therapy  Referral placed  Orders:  -     Large joint arthrocentesis: L knee  -     Ambulatory Referral to Physical Therapy;  Future

## 2023-06-16 ENCOUNTER — PATIENT OUTREACH (OUTPATIENT)
Dept: FAMILY MEDICINE CLINIC | Facility: CLINIC | Age: 53
End: 2023-06-16

## 2023-06-16 NOTE — PROGRESS NOTES
In Person:  6/16/2023    [facility] did meet with pt at [facility] for scheduled appointment  CMOC called [employer] with pt to check on status of MA/SNAP application submitted last week  We were informed pt's case has been transferred from [facility] to [facility]  Pt will continue to receive $281 a month in SNAP benefits  Pt will continue to receive MA benefits through [employer]  [facility] competed a [email or web address] application on behalf of pt and scanned to [address] for review  CMOC called Lea and spoke with Sharri Morocho to request 60 days temp services  [facility] informed to check back next week  CMOC also informed pt he will receive a call from [facility] to schedule an evaluation to see if he is eligible for ongoing services  Pt expressed understanding and thanked [facility] for her time  CMOC was unable to complete [employer] application with pt today as he did not bring his insurance or EBT card  CMOC informed pt that [employer] has a tent set up on [address] and if he applies with insurance and ID they will issue a phone on the spot  Pt informed he will attempt to visit and apply in person  Next outreach is scheduled for 6/23/2023

## 2023-06-20 NOTE — PROGRESS NOTES
Note Type: Case Management Note                  Date of Service: 06/12/2023  Service:  [facility]    Note: Case Management Note  Josh Wilkes 46 y o  male [id number]  Attending  Substance Use History     Social History     Substance and Sexual Activity   Alcohol Use Not Currently        Social History     Substance and Sexual Activity   Drug Use Not Currently       Encounter Type:   Patient Face-to-Face    Start Time 1020  End Time 1040    Recovery needs addressed at this meeting:  Basic  Needs, Physical Health, Emotional/Mental Health, Social, Peer Support  and Recovery Resources    Note  D: Patient presented for in-person, scheduled visit with this CM  Translation services were provided throughout the duration of this session  This is to be patient's initial visit with this CM as well as the [facility] office  Please utilize this documentation as formal intake  Patient was referred by PCP, Ramon Bird for hx of heroin and cocaine use  Patient states last use to be 10 mos and two days ago  Patient is currently staying at [facility], not interested in MAT services but would like to address overall mental health - Therapy, psychiatry services  Patient did attend rehab in [place], unable to recall name of facility  No hx of criminal charges  Patient is currently unemployed, though working within [facility]  A: Patient and this CM did complete Symptom Checklist in which patient did state to have previous dx of schizoaffective, bipolar and depression  Patient did not state to currently experience any symptoms of depression and/or anxiety at this moment  Patient was able to verbalize requests of current needs at this time  Patient did not state to be a danger to self or others, no symptoms of SI/HI      P: Upon completion of BPS, this CM mentioned patient will be placed on wait list for Psychiatry services and this CM will reach out to Saima Lubintherapist to discuss if possible services will be available for patient as Corinne Cannon is Croatian speaking and there will be no language barrier within sessions         Referrals made  Please see above recommendations    Next appointment date and time  No follow up appts scheduled with this CM at this time

## 2023-06-21 ENCOUNTER — PATIENT OUTREACH (OUTPATIENT)
Dept: FAMILY MEDICINE CLINIC | Facility: CLINIC | Age: 53
End: 2023-06-21

## 2023-06-21 NOTE — PROGRESS NOTES
Outgoing Calls:  6/21/2023    [facility] returned missed call from pt  Pt informs he now has found his ID and insurance card needed to apply for free cell phone  Appointment was scheduled to meet with [facility] called Lea and spoke with Dimas Aly who informed pt has been approved for 60 days temp services and will end on 8/16/2023  [facility] will inform pt of this and discuss how to schedule appointments at next outreach       Next outreach is scheduled for 6/23/2023 at [address] [place] [address]

## 2023-06-23 ENCOUNTER — PATIENT OUTREACH (OUTPATIENT)
Dept: FAMILY MEDICINE CLINIC | Facility: CLINIC | Age: 53
End: 2023-06-23

## 2023-06-23 NOTE — LETTER
06/23/23    Estimado/a Andrew Álvarez trabajador comunitario de la bir de [facility]  [facility]  [address] [address]    Intenté comunicarme con usted por teléfono varias veces  Es importante que me llame al [phone] para que pueda ofrecerle ayuda con sixto necesidades de [employer]  Atentamente         Sonny Mills, [facility]

## 2023-06-23 NOTE — PROGRESS NOTES
Letter:  6/23/2023    Pt did not show for scheduled appointment with [address] [place] [address]  Letter sent  Next outreach is scheduled for 6/30/2023

## 2023-06-30 ENCOUNTER — PATIENT OUTREACH (OUTPATIENT)
Dept: FAMILY MEDICINE CLINIC | Facility: CLINIC | Age: 53
End: 2023-06-30

## 2023-06-30 NOTE — PROGRESS NOTES
Outgoing Call:  6/30/2023    Lazaro Clarke called pt to discuss his missed appointment with Lazaro Clarke to apply for free [employer] cell phone  Lazaro Clarke spoke with Edison Shook who took [facility] number and informed he will have pt call back  Next outreach is scheduled for 7/7/2023

## 2023-07-06 ENCOUNTER — PATIENT OUTREACH (OUTPATIENT)
Dept: FAMILY MEDICINE CLINIC | Facility: CLINIC | Age: 53
End: 2023-07-06

## 2023-07-06 NOTE — PROGRESS NOTES
Frederic Text:  7/6/2023    CMOC received a TT from [facility]  informing pt is at  and would like to reschedule as he missed last appointment with [facility]. Pt agreed to meet tomorrow and is aware referral will be closed if he no shows.      Next outreach is scheduled for 7/7/2023 at [address] [place] [address].

## 2023-07-07 ENCOUNTER — PATIENT OUTREACH (OUTPATIENT)
Dept: FAMILY MEDICINE CLINIC | Facility: CLINIC | Age: 53
End: 2023-07-07

## 2023-07-07 NOTE — PROGRESS NOTES
Outgoing Call:  7/7/2023    [facility] called pt as he was a no show again to meet with [facility]. Pt apologized and informed he did not meet with [facility] as it was raining out. [facility] informed she will attempt to meet with pt once more but if he no shows again this referral will be closed. Pt expressed understanding. Pt informs he has a letter from [place] [facility] that he does not understand. Pt also received information from [facility] and would like to share with [facility].      Final outreach is scheduled for 7/10/2023 at [address] [place] [address] at [facility].

## 2023-07-10 ENCOUNTER — PATIENT OUTREACH (OUTPATIENT)
Dept: FAMILY MEDICINE CLINIC | Facility: CLINIC | Age: 53
End: 2023-07-10

## 2023-07-10 NOTE — PROGRESS NOTES
In Person:  7/10/2023    [facility] met with pt at [facility] for scheduled appointment. Pt did bring a letter with him from [facility]. Letter requested information about current conditions and upcoming appointments. [facility] completed with information provided by pt. Pt did bring a letter with him from [address] [place] [address] they need a legible copy of his ID. Pt provided and [facility] scanned to [facility] for review. Pt did bring his insurance card and requested assistance with applying for a free cell phone. CMOC completed an application on behalf of pt with information provided by him on [employer] website. Pt was denied and proof of identity was requested. CMOC provided via scan. Will f/u. Next outreach is scheduled for 7/17/2023.

## 2023-07-18 ENCOUNTER — PATIENT OUTREACH (OUTPATIENT)
Dept: FAMILY MEDICINE CLINIC | Facility: CLINIC | Age: 53
End: 2023-07-18

## 2023-07-24 ENCOUNTER — PATIENT OUTREACH (OUTPATIENT)
Dept: FAMILY MEDICINE CLINIC | Facility: CLINIC | Age: 53
End: 2023-07-24

## 2023-07-24 ENCOUNTER — TELEPHONE (OUTPATIENT)
Dept: FAMILY MEDICINE CLINIC | Facility: CLINIC | Age: 53
End: 2023-07-24

## 2023-07-24 NOTE — TELEPHONE ENCOUNTER
[facility] Disability Professional Verification form received on 7/24/23  to be completed by PCP. Copy made and placed in PCP folder. Forms to be delivered to PCP mailbox at assigned time.

## 2023-07-24 NOTE — PROGRESS NOTES
In Person:  7/24/2023    [facility] met with pt at [facility] for scheduled appointment. Pt did bring with him two letters. One letter from [address] he will need a Disability Certification application to be completed by PCP. Pt did have an assessment scheduled for last week but upon arrival to [facility] assessment was canceled since he did not bring his cane with him. Once form is completed [facility] can scan and submit to [facility] for review. [facility] completed demographics and forwarded to [address] [place] [address] for completion. [facility] reviewed second letter from [address] pt was denied SSD as he did not earn enough work credits however an application for SSI benefits was submitted and is currently under review. Next outreach is scheduled for 7/31/2023.

## 2023-07-31 ENCOUNTER — PATIENT OUTREACH (OUTPATIENT)
Dept: FAMILY MEDICINE CLINIC | Facility: CLINIC | Age: 53
End: 2023-07-31

## 2023-07-31 NOTE — PROGRESS NOTES
Chart Review:  7/31/2023    Chart reviewed. [facility] received Disability Professional Verification form which Kg Higginbotham completed. [facility] faxed/scanned to [facility] for review. Next outreach is scheduled for 8/7/2023.

## 2023-08-08 ENCOUNTER — PATIENT OUTREACH (OUTPATIENT)
Dept: FAMILY MEDICINE CLINIC | Facility: CLINIC | Age: 53
End: 2023-08-08

## 2023-08-08 NOTE — PROGRESS NOTES
Outgoing Call:  8/8/2023    Chart reviewed. CMOC called Lea and spoke with Sindi Hull to check on status of [employer] application. Pt's PCP completed the Disability Professional Verification forms for pt. [facility] faxed to Yasemin Hendricks on 7/31/23. Sindi Hull informed they have 21 days to receive and confirmed it is under review. Next outreach is scheduled for 8/15/2023.

## 2023-08-09 ENCOUNTER — TELEPHONE (OUTPATIENT)
Dept: FAMILY MEDICINE CLINIC | Facility: CLINIC | Age: 53
End: 2023-08-09

## 2023-08-09 NOTE — TELEPHONE ENCOUNTER
Record request from BRENDA received @8/9/23@. Faxed to [address] [place] [address]. Receipt received.

## 2023-08-14 ENCOUNTER — OFFICE VISIT (OUTPATIENT)
Dept: FAMILY MEDICINE CLINIC | Facility: CLINIC | Age: 53
End: 2023-08-14

## 2023-08-14 ENCOUNTER — PATIENT OUTREACH (OUTPATIENT)
Dept: FAMILY MEDICINE CLINIC | Facility: CLINIC | Age: 53
End: 2023-08-14

## 2023-08-14 VITALS
BODY MASS INDEX: 29.98 KG/M2 | RESPIRATION RATE: 18 BRPM | DIASTOLIC BLOOD PRESSURE: 86 MMHG | HEART RATE: 63 BPM | SYSTOLIC BLOOD PRESSURE: 124 MMHG | OXYGEN SATURATION: 97 % | WEIGHT: 202.4 LBS | TEMPERATURE: 97.7 F | HEIGHT: 69 IN

## 2023-08-14 DIAGNOSIS — Z76.0 MEDICATION REFILL: Primary | ICD-10-CM

## 2023-08-14 PROCEDURE — 99213 OFFICE O/P EST LOW 20 MIN: CPT | Performed by: FAMILY MEDICINE

## 2023-08-14 NOTE — PROGRESS NOTES
Name: Radha Whaley      : 1970      MRN: [id number]  Encounter Provider: Nu Diallo MD  Encounter Date: 2023   Encounter department: [address] [place] [address]    Assessment & Plan     Assessment   Patient with polypharmacy, unaware of medications that he is currently taken. Recommended to come with the person that handle his medications to assess compliancy. Recommended to perform pending labs and Valproic acid levels. ED precaution given. Primary hypertension  Assessment:   · Blood pressure at today’s office visit 124/86 mmHg, controlled  · Blood Pressure goal as per JNC-8 less than 140/90 mmHg,   Unaware if currently taking his medications     Plan:   · Follow up at next visit with physical bottles and the person that help him with handling his medications. · BP goals and possible side effects reviewed with patient, recommended to call the office/schedule appointment  if need prior to his next visit if needed   · The patient was educated on the importance of medication compliance and to monitor her blood pressure at home on a  daily basis. · Ideally in quiet room; after 5 minutes of rest, sited, legs uncrossed and in a relaxed environment. · Recommended to  bring BP diary in visit. · Will recommend performing aerobic exercise for at least more than 3 times per week or more that 150 min x week of moderate physical activity. · Will recommend sodium and fat reduction and to increase fruit consumption. Subjective      Source of information: patient and EMR review  Information obtained in Bengali  Patient not well known to me. It was a pleasure to [address] [place] [address] Street is a 46 y. Elinore Jury with a complex past medical hx, here for medications refill. Patient unaware of medications that he is currently taking  Lives in an assisting living facility , no family support.  Reports that his medication are given by the personal that works in the [employer]. Current psychopharms per EMRincludes lexapro, bentyl, Depakote, ataraz, Seroquel, trifluoperazine, prochlorperazine, doxepin, clonidine. Last time he took his medication was 7 days ago, no compliant. Patient's medical conditions are stable unless noted otherwise above.  Patient has not had any recent hospitalizations, or medical emergencies since last visit. Overall patient reports feeling well. Review of Systems   Constitutional: Negative for activity change, appetite change, chills, fatigue and fever. HENT: Negative for congestion, postnasal drip, rhinorrhea and sore throat. Eyes: Negative for visual disturbance. Respiratory: Negative for cough, chest tightness, shortness of breath and wheezing. Cardiovascular: Negative for chest pain, palpitations and leg swelling. Gastrointestinal: Negative for abdominal distention, abdominal pain, blood in stool, constipation, diarrhea, nausea and vomiting. Genitourinary: Negative for difficulty urinating, dysuria and hematuria. Musculoskeletal: Negative for arthralgias and myalgias. Skin: Negative for rash. Neurological: Negative for dizziness, syncope, weakness, light-headedness, numbness and headaches. Psychiatric/Behavioral: Negative for agitation, behavioral problems, self-injury, sleep disturbance and suicidal ideas. The patient is not nervous/anxious.         Current Outpatient Medications on File Prior to Visit   Medication Sig   • acetaminophen (TYLENOL) 325 mg tablet Take 2 tablets (650 mg total) by mouth every 6 (six) hours as needed for severe pain   • Aspirin Low Dose 81 MG EC tablet Take 1 tablet (81 mg total) by mouth daily   • atorvastatin (LIPITOR) 10 mg tablet Take 1 tablet (10 mg total) by mouth daily   • cloNIDine (CATAPRES) 0.1 mg tablet Take 1 tablet (0.1 mg total) by mouth in the morning   • diclofenac sodium (VOLTAREN) 50 mg EC tablet Take 1 tablet (50 mg total) by mouth 2 (two) times a day   • dicyclomine (BENTYL) 10 mg/5 mL oral solution Take 20 mg by mouth 4 (four) times a day (before meals and at bedtime)   • divalproex sodium (DEPAKOTE ER) 500 mg 24 hr tablet Take 1 tablet (500 mg total) by mouth daily   • divalproex sodium (DEPAKOTE) 500 mg DR tablet Take 500 mg by mouth every 8 (eight) hours   • docusate sodium (COLACE) 100 mg capsule Take 1 capsule (100 mg total) by mouth 2 (two) times a day as needed for constipation   • doxepin (SINEquan) 50 mg capsule Take 1 capsule (50 mg total) by mouth in the morning   • escitalopram (LEXAPRO) 20 mg tablet Take 1 tablet (20 mg total) by mouth daily   • furosemide (LASIX) 20 mg tablet Take 1 tablet (20 mg total) by mouth 2 (two) times a day   • hydrochlorothiazide (MICROZIDE) 12.5 mg capsule Take 1 capsule (12.5 mg total) by mouth in the morning   • hydrOXYzine HCL (ATARAX) 50 mg tablet Take 1 tablet (50 mg total) by mouth 3 (three) times a day as needed for anxiety   • ibuprofen (MOTRIN) 600 mg tablet Take by mouth every 6 (six) hours as needed   • naloxone (NARCAN) 4 mg/0.1 mL nasal spray 0.1 mL (4 mg total) into each nostril every 3 (three) minutes as needed for opioid reversal or respiratory depression   • prochlorperazine (COMPAZINE) 5 mg tablet Take 5 mg by mouth every 6 (six) hours as needed   • QUEtiapine (SEROquel) 200 mg tablet Take 1 tablet (200 mg total) by mouth in the morning   • QUEtiapine (SEROquel) 300 mg tablet Take 1 tablet (300 mg total) by mouth daily at bedtime   • trifluoperazine (STELAZINE) 2 mg tablet Take 1 tablet (2 mg total) by mouth in the morning       Objective     /86 (BP Location: Left arm, Patient Position: Sitting, Cuff Size: Standard)   Pulse 63   Temp 97.7 °F (36.5 °C) (Temporal)   Resp 18   Ht 5' 9" (1.753 m)   Wt 91.8 kg (202 lb 6.4 oz)   SpO2 97%   BMI 29.89 kg/m²     Physical Exam  Vitals and nursing note reviewed. Constitutional:       General: He is not in acute distress.      Appearance: He is well-developed and overweight. He is not ill-appearing, toxic-appearing or diaphoretic. HENT:      Head: Normocephalic and atraumatic. Eyes:      General: No scleral icterus. Extraocular Movements: Extraocular movements intact. Cardiovascular:      Rate and Rhythm: Normal rate and regular rhythm. No extrasystoles are present. Pulses:           Radial pulses are 2+ on the right side and 2+ on the left side. Heart sounds: S1 normal and S2 normal. Murmur ( aortic region ) heard. Systolic murmur is present with a grade of 2/6. No friction rub. No gallop. Pulmonary:      Effort: Pulmonary effort is normal. No respiratory distress. Breath sounds: Normal breath sounds and air entry. Abdominal:      General: Bowel sounds are normal.      Palpations: Abdomen is soft. There is no mass. Tenderness: There is no abdominal tenderness. There is no right CVA tenderness, left CVA tenderness, guarding or rebound. Musculoskeletal:         General: No swelling, tenderness, deformity or signs of injury. Normal range of motion. Cervical back: Normal range of motion. Right lower leg: No edema. Left lower leg: No edema. Feet:      Right foot:      Skin integrity: No ulcer, skin breakdown, erythema, warmth, callus or dry skin. Left foot:      Skin integrity: No ulcer, skin breakdown, erythema, warmth, callus or dry skin. Skin:     General: Skin is warm. Findings: No rash. Neurological:      General: No focal deficit present. Mental Status: He is alert.        Garret Lorenzo MD

## 2023-08-14 NOTE — PROGRESS NOTES
Outgoing Call:  8/14/2023    [facility] [place] [address] received a TT from [address] pt requested to speak with [address] [place] [address] in reference to ASHLI PIERCE [facility] paperwork he needs completed. [address] [place] [address] called pt and informed she can review forms with him. Pt agreed to meet later today after his PCP appointment. Next outreach is scheduled for 8/14/23 at 3:30PM at [facility].     Later entry:  Pt was a no show for scheduled appointment with [address] [place] [address]. Next outreach is scheduled for 8/15/2023.

## 2023-08-14 NOTE — ASSESSMENT & PLAN NOTE
Assessment:   · Blood pressure at today’s office visit 124/86 mmHg, controlled  · Blood Pressure goal as per JNC-8 less than 140/90 mmHg,   Unaware if currently taking his medications     Plan:   · Follow up at next visit with physical bottles and the person that help him with handling his medications. · BP goals and possible side effects reviewed with patient, recommended to call the office/schedule appointment  if need prior to his next visit if needed   · The patient was educated on the importance of medication compliance and to monitor her blood pressure at home on a  daily basis. · Ideally in quiet room; after 5 minutes of rest, sited, legs uncrossed and in a relaxed environment. · Recommended to  bring BP diary in visit. · Will recommend performing aerobic exercise for at least more than 3 times per week or more that 150 min x week of moderate physical activity. · Will recommend sodium and fat reduction and to increase fruit consumption.

## 2023-08-15 ENCOUNTER — PATIENT OUTREACH (OUTPATIENT)
Dept: FAMILY MEDICINE CLINIC | Facility: CLINIC | Age: 53
End: 2023-08-15

## 2023-08-15 NOTE — PROGRESS NOTES
Outgoing Calls:  8/15/2023    [facility] called Lea and spoke with Deacon to check on status of [employer] application. Deacon informs they have everything needed from PCP however still under review. [address] called pt to update him as well as offer a new appointment to meet as he was a no show for scheduled appointment yesterday. Maci Torres at [facility]- answered call and agreed to forward message. Next outreach is scheduled for 8/22/2023.

## 2023-08-17 LAB
ALBUMIN SERPL-MCNC: 4.5 G/DL (ref 3.6–5.1)
ALBUMIN/GLOB SERPL: 1.7 (CALC) (ref 1–2.5)
ALP SERPL-CCNC: 59 U/L (ref 35–144)
ALT SERPL-CCNC: 11 U/L (ref 9–46)
AST SERPL-CCNC: 18 U/L (ref 10–35)
BASOPHILS # BLD AUTO: 29 CELLS/UL (ref 0–200)
BASOPHILS NFR BLD AUTO: 0.5 %
BILIRUB SERPL-MCNC: 0.7 MG/DL (ref 0.2–1.2)
BUN SERPL-MCNC: 11 MG/DL (ref 7–25)
BUN/CREAT SERPL: NORMAL (CALC) (ref 6–22)
CALCIUM SERPL-MCNC: 9.4 MG/DL (ref 8.6–10.3)
CHLORIDE SERPL-SCNC: 105 MMOL/L (ref 98–110)
CHOLEST SERPL-MCNC: 210 MG/DL
CHOLEST/HDLC SERPL: 7.2 (CALC)
CO2 SERPL-SCNC: 27 MMOL/L (ref 20–32)
CREAT SERPL-MCNC: 0.89 MG/DL (ref 0.7–1.3)
EOSINOPHIL # BLD AUTO: 133 CELLS/UL (ref 15–500)
EOSINOPHIL NFR BLD AUTO: 2.3 %
ERYTHROCYTE [DISTWIDTH] IN BLOOD BY AUTOMATED COUNT: 11.6 % (ref 11–15)
GFR/BSA.PRED SERPLBLD CYS-BASED-ARV: 102 ML/MIN/1.73M2
GLOBULIN SER CALC-MCNC: 2.7 G/DL (CALC) (ref 1.9–3.7)
GLUCOSE SERPL-MCNC: 98 MG/DL (ref 65–99)
HBA1C MFR BLD: 5 % OF TOTAL HGB
HCT VFR BLD AUTO: 39.4 % (ref 38.5–50)
HCV AB SERPL QL IA: REACTIVE
HCV RNA SERPL NAA+PROBE-ACNC: ABNORMAL IU/ML
HCV RNA SERPL NAA+PROBE-LOG IU: ABNORMAL LOG IU/ML
HDLC SERPL-MCNC: 29 MG/DL
HGB BLD-MCNC: 13.6 G/DL (ref 13.2–17.1)
HIV 1+2 AB+HIV1 P24 AG SERPL QL IA: NORMAL
LDLC SERPL CALC-MCNC: 138 MG/DL (CALC)
LYMPHOCYTES # BLD AUTO: 1908 CELLS/UL (ref 850–3900)
LYMPHOCYTES NFR BLD AUTO: 32.9 %
MCH RBC QN AUTO: 32.2 PG (ref 27–33)
MCHC RBC AUTO-ENTMCNC: 34.5 G/DL (ref 32–36)
MCV RBC AUTO: 93.4 FL (ref 80–100)
MONOCYTES # BLD AUTO: 447 CELLS/UL (ref 200–950)
MONOCYTES NFR BLD AUTO: 7.7 %
NEUTROPHILS # BLD AUTO: 3283 CELLS/UL (ref 1500–7800)
NEUTROPHILS NFR BLD AUTO: 56.6 %
NONHDLC SERPL-MCNC: 181 MG/DL (CALC)
PLATELET # BLD AUTO: 201 THOUSAND/UL (ref 140–400)
PMV BLD REES-ECKER: 10 FL (ref 7.5–12.5)
POTASSIUM SERPL-SCNC: 4.5 MMOL/L (ref 3.5–5.3)
PROT SERPL-MCNC: 7.2 G/DL (ref 6.1–8.1)
RBC # BLD AUTO: 4.22 MILLION/UL (ref 4.2–5.8)
SODIUM SERPL-SCNC: 139 MMOL/L (ref 135–146)
TRIGL SERPL-MCNC: 282 MG/DL
TSH SERPL-ACNC: 1.11 MIU/L (ref 0.4–4.5)
VALPROATE SERPL-MCNC: <12.5 MG/L (ref 50–100)
WBC # BLD AUTO: 5.8 THOUSAND/UL (ref 3.8–10.8)

## 2023-08-22 ENCOUNTER — PATIENT OUTREACH (OUTPATIENT)
Dept: FAMILY MEDICINE CLINIC | Facility: CLINIC | Age: 53
End: 2023-08-22

## 2023-08-22 NOTE — PROGRESS NOTES
Outgoing Calls:  8/22/2023    Chart reviewed. [address] [place] [address] called Lea to check on status of pt's [employer] application. Deacon informed pt is approved for ongoing services and welcome packet has been sent to pt. [address] [place] [address] called pt to inform him of this. Zhanna uCellar answered call and informed he will give message to pt and have him call back. [address] [place] [address] will also inform pt he can attempt to apply for free cell phone tomorrow at [facility], an [address] [place] [address] vendor will be present offering services to pts. Pt was a no show for last appointment and has not returned CMOC's calls. Final outreach is scheduled for 8/29/2023. Later entry:  Pt returned CMOC's call. [address] [place] [address] informed pt he has been approved for [address] [place] [address] services. Pt also informed he would like to meet with [address] [place] [address] to review SSA paperwork. [address] [place] [address] informed if appt is missed referral will  be closed as he has missed last two appts.  Pt expressed understanding     Next outreach is scheduled for 8/23/2023 at [address] [place] [address] at [facility].

## 2023-08-23 ENCOUNTER — PATIENT OUTREACH (OUTPATIENT)
Dept: FAMILY MEDICINE CLINIC | Facility: CLINIC | Age: 53
End: 2023-08-23

## 2023-08-23 NOTE — PROGRESS NOTES
In Person:  8/23/2023    [facility] met with pt at [facility] for scheduled appointment. Pt did bring documents from [employer] with him and asked [facility] to assist in completing. [facility] reviewed forms needed to complete his SSI application. [facility] informed she does not complete forms however forms requested job history and pt states he has only held one job for a few weeks. CMOC completed that section for him and pt signed application. Pt states he will drop off at local [employer] [place] office tomorrow. [facility] assisted pt in applying for a free cell phone at [facility] with [address] [place] [address] vendor. Pt was denied due to having an active application with [facility]. Pt was directed to close out application. [facility] called [employer] with pt and requested application be closed. Pt was informed to attempt applying for free cell phone once again, next week.       Next outreach is scheduled for 8/30/2023 at [facility] at [facility].

## 2023-08-24 ENCOUNTER — OFFICE VISIT (OUTPATIENT)
Dept: FAMILY MEDICINE CLINIC | Facility: CLINIC | Age: 53
End: 2023-08-24

## 2023-08-24 VITALS
SYSTOLIC BLOOD PRESSURE: 122 MMHG | WEIGHT: 205 LBS | OXYGEN SATURATION: 98 % | DIASTOLIC BLOOD PRESSURE: 80 MMHG | RESPIRATION RATE: 18 BRPM | HEIGHT: 69 IN | HEART RATE: 63 BPM | BODY MASS INDEX: 30.36 KG/M2 | TEMPERATURE: 97.5 F

## 2023-08-24 DIAGNOSIS — Z79.899 POLYPHARMACY: ICD-10-CM

## 2023-08-24 DIAGNOSIS — E78.5 HYPERLIPIDEMIA, UNSPECIFIED HYPERLIPIDEMIA TYPE: ICD-10-CM

## 2023-08-24 DIAGNOSIS — F25.8 OTHER SCHIZOAFFECTIVE DISORDERS (HCC): Primary | ICD-10-CM

## 2023-08-24 PROCEDURE — 99214 OFFICE O/P EST MOD 30 MIN: CPT | Performed by: FAMILY MEDICINE

## 2023-08-24 RX ORDER — DIVALPROEX SODIUM 500 MG/1
500 TABLET, EXTENDED RELEASE ORAL DAILY
Qty: 90 TABLET | Refills: 0 | Status: SHIPPED | OUTPATIENT
Start: 2023-08-24 | End: 2023-11-22

## 2023-08-24 RX ORDER — ATORVASTATIN CALCIUM 40 MG/1
40 TABLET, FILM COATED ORAL DAILY
Qty: 90 TABLET | Refills: 3 | Status: SHIPPED | OUTPATIENT
Start: 2023-08-24

## 2023-08-24 NOTE — PROGRESS NOTES
Name: Jaylene Rosenberg      : 1970      MRN: [id number]  Encounter Provider: Dann Kent MD  Encounter Date: 2023   Encounter department: [facility]     1. Other schizoaffective disorders ([facility])  Assessment & Plan:  Assessment   Patient reports that sporadically continues to hear command voices like Renu Villa come here- "Kalen Brochure", to take his own life-"quitate la grupo" and non command-telling him that he is worthless-"tu no zoltan nada". Last event about a week ago. Have had thoughts in of committing suicidal by using a rope to hang himself. Patient denies previous suicidal attempts. Currently not hearing voices, no delusions, denies suicidal/homicidal ideations, denies current plan   Patient reports feeling well with treatment  Recent labs showed Depakote levels at subtherapeutic levels. Currently on Depakote 500 qd, clonidine 0.1 mg am qd, doxepin 50 mg AM qd, Atarax 50 mg  Tid, quetiapine 200 mg  mg HS qd trifluoperazine 2 mg AM qd  , denies side effects, states the medication is working, but unsure of compliance     Plan  -Will refer the patient for psychological evaluation and counseling.  -Medication refilled. -Depakote levels reordered for 2 weeks   -ED precautions were given for suicidal/homicidal/delusions/hallucinations           Orders:  -     Ambulatory Referral to [facility]; Future  -     divalproex sodium (DEPAKOTE ER) 500 mg 24 hr tablet; Take 1 tablet (500 mg total) by mouth daily    2. Polypharmacy  -     Ambulatory Referral to [facility]; Future    3. Hyperlipidemia, unspecified hyperlipidemia type         Subjective      Source of information: patient and friend  Information obtained in [id number]   It was a pleasure to [address] is a 46 y. Be Beards with a complex PMH who presents today accompanied for medication refill.  Patient was seen at his last visit, Depakote levels were ordered resulting in suboptimal levels refill-Asher Edwards a friend of the patient here with the authorization of the patient, Reports that they are in a rehab center [facility].   -Reports that between director of the center-Cheryle [address] [place] [address] phone [phone], Subdirector Melba Polanco) and Cuco Ford are in charge of distributing the medications in the center.  -Patient sister Daria Alvarado(sister) lives in [place] phone number [phone]  Denies unintentional weight loss, fever, chills, fatigue, weakness, headaches, dizziness, rashes, blurred vision, nausea, palpitation, chest pain, SOB, abdominal pain, urinary changes, bowel changes, legs swelling/pain, sleep problems,  sick contacts or recent travel. Patient's medical conditions are stable unless noted otherwise above.  Patient has not had any recent hospitalizations, or medical emergencies since last visit. Patient reports compliance with her medications. Overall patient reports feeling well. Patient has no further complaints other than what is mentioned in the ROS. Review of Systems   Constitutional: Negative for activity change, appetite change, chills, fatigue and fever. HENT: Negative for congestion, ear pain, postnasal drip, rhinorrhea and sore throat. Eyes: Negative for pain and visual disturbance. Respiratory: Negative for cough, chest tightness, shortness of breath and wheezing. Cardiovascular: Negative for chest pain, palpitations and leg swelling. Gastrointestinal: Negative for abdominal distention, abdominal pain, blood in stool, constipation, diarrhea, nausea and vomiting. Genitourinary: Negative for difficulty urinating, dysuria and hematuria. Musculoskeletal: Negative for arthralgias, back pain and myalgias. Skin: Negative for color change and rash. Neurological: Negative for dizziness, seizures, syncope, weakness, light-headedness, numbness and headaches. Psychiatric/Behavioral: Negative for agitation, behavioral problems, self-injury, sleep disturbance and suicidal ideas. All other systems reviewed and are negative.       Current Outpatient Medications on File Prior to Visit   Medication Sig   • acetaminophen (TYLENOL) 325 mg tablet Take 2 tablets (650 mg total) by mouth every 6 (six) hours as needed for severe pain   • Aspirin Low Dose 81 MG EC tablet Take 1 tablet (81 mg total) by mouth daily   • cloNIDine (CATAPRES) 0.1 mg tablet Take 1 tablet (0.1 mg total) by mouth in the morning   • diclofenac sodium (VOLTAREN) 50 mg EC tablet Take 1 tablet (50 mg total) by mouth 2 (two) times a day   • dicyclomine (BENTYL) 10 mg/5 mL oral solution Take 20 mg by mouth 4 (four) times a day (before meals and at bedtime)   • docusate sodium (COLACE) 100 mg capsule Take 1 capsule (100 mg total) by mouth 2 (two) times a day as needed for constipation   • doxepin (SINEquan) 50 mg capsule Take 1 capsule (50 mg total) by mouth in the morning   • escitalopram (LEXAPRO) 20 mg tablet Take 1 tablet (20 mg total) by mouth daily   • furosemide (LASIX) 20 mg tablet Take 1 tablet (20 mg total) by mouth 2 (two) times a day   • hydrochlorothiazide (MICROZIDE) 12.5 mg capsule Take 1 capsule (12.5 mg total) by mouth in the morning   • hydrOXYzine HCL (ATARAX) 50 mg tablet Take 1 tablet (50 mg total) by mouth 3 (three) times a day as needed for anxiety   • ibuprofen (MOTRIN) 600 mg tablet Take by mouth every 6 (six) hours as needed   • naloxone (NARCAN) 4 mg/0.1 mL nasal spray 0.1 mL (4 mg total) into each nostril every 3 (three) minutes as needed for opioid reversal or respiratory depression   • prochlorperazine (COMPAZINE) 5 mg tablet Take 5 mg by mouth every 6 (six) hours as needed   • QUEtiapine (SEROquel) 200 mg tablet Take 1 tablet (200 mg total) by mouth in the morning   • QUEtiapine (SEROquel) 300 mg tablet Take 1 tablet (300 mg total) by mouth daily at bedtime   • trifluoperazine (STELAZINE) 2 mg tablet Take 1 tablet (2 mg total) by mouth in the morning   • [DISCONTINUED] atorvastatin (LIPITOR) 10 mg tablet Take 1 tablet (10 mg total) by mouth daily   • [DISCONTINUED] divalproex sodium (DEPAKOTE ER) 500 mg 24 hr tablet Take 1 tablet (500 mg total) by mouth daily   • [DISCONTINUED] divalproex sodium (DEPAKOTE) 500 mg DR tablet Take 500 mg by mouth every 8 (eight) hours       Objective     /80 (BP Location: Right arm, Patient Position: Sitting, Cuff Size: Standard)   Pulse 63   Temp 97.5 °F (36.4 °C) (Temporal)   Resp 18   Ht 5' 9" (1.753 m)   Wt 93 kg (205 lb)   SpO2 98%   BMI 30.27 kg/m²     Physical Exam  Vitals and nursing note reviewed. Constitutional:       General: He is not in acute distress. Appearance: He is well-developed. He is not ill-appearing, toxic-appearing or diaphoretic. HENT:      Head: Normocephalic and atraumatic. Eyes:      General: No scleral icterus. Extraocular Movements: Extraocular movements intact. Cardiovascular:      Rate and Rhythm: Normal rate and regular rhythm. No extrasystoles are present. Pulses:           Radial pulses are 2+ on the right side and 2+ on the left side. Heart sounds: Normal heart sounds, S1 normal and S2 normal. No murmur heard. No friction rub. No gallop. Pulmonary:      Effort: Pulmonary effort is normal. No respiratory distress. Breath sounds: Normal breath sounds and air entry. Abdominal:      General: Bowel sounds are normal.      Palpations: Abdomen is soft. There is no mass. Tenderness: There is no abdominal tenderness. There is no right CVA tenderness, left CVA tenderness, guarding or rebound. Musculoskeletal:         General: No swelling, tenderness, deformity or signs of injury. Normal range of motion. Cervical back: Normal range of motion. Right lower leg: No edema. Left lower leg: No edema.    Feet:      Right foot:      Skin integrity: No ulcer, skin breakdown, erythema, warmth, callus or dry skin. Left foot:      Skin integrity: No ulcer, skin breakdown, erythema, warmth, callus or dry skin. Skin:     General: Skin is warm. Findings: No rash. Neurological:      General: No focal deficit present. Mental Status: He is alert.        Dann Kent MD

## 2023-08-24 NOTE — ASSESSMENT & PLAN NOTE
Assessment   Patient reports that sporadically continues to hear command voices like Cristal Grant come here- "Horton Homans", to take his own life-"quitate la grupo" and non command-telling him that he is worthless-"tu no zoltan nada". Last event about a week ago. Have had thoughts in of committing suicidal by using a rope to hang himself. Patient denies previous suicidal attempts. Currently not hearing voices, no delusions, denies suicidal/homicidal ideations, denies current plan   Patient reports feeling well with treatment  Recent labs showed Depakote levels at subtherapeutic levels. Currently on Depakote 500 qd, clonidine 0.1 mg am qd, doxepin 50 mg AM qd, Atarax 50 mg  Tid, quetiapine 200 mg  mg HS qd trifluoperazine 2 mg AM qd  , denies side effects, states the medication is working, but unsure of compliance     Plan  -Will refer the patient for psychological evaluation and counseling.  -Medication refilled.    -Depakote levels reordered for 2 weeks   -ED precautions were given for suicidal/homicidal/delusions/hallucinations

## 2023-08-30 ENCOUNTER — PATIENT OUTREACH (OUTPATIENT)
Dept: FAMILY MEDICINE CLINIC | Facility: CLINIC | Age: 53
End: 2023-08-30

## 2023-08-30 NOTE — PROGRESS NOTES
Outgoing Call:  8/30/2023    Chart reviewed. CMOC called pt as he did not show for his scheduled appointment with [facility]. [facility] called pt's number and spoke with Melba Polanco, requested a call in return. Final outreach is scheduled for 9/6/2023.

## 2023-09-06 ENCOUNTER — PATIENT OUTREACH (OUTPATIENT)
Dept: FAMILY MEDICINE CLINIC | Facility: CLINIC | Age: 53
End: 2023-09-06

## 2023-09-06 NOTE — PROGRESS NOTES
Outgoing Call:  9/6/2023    [facility] called pt to discuss missed appointment last week to attempt to apply for a free cell phone. Pt has been denied twice with [facility]. Pt informs he attempted to see a local vendor once again however he shows up as being active with services. Pt again denied and was informed by the vendor to wait a couple of months before reapplying. [facility] encouraged pt to go to [address] [place] [address] on any Wednesday to apply with an [employer] vendor once his three months have passed. Pt agreed to do this. Pt is aware this referral will be closed as all referral needs have been met. CMOC applied and was able to get pt approved for [employer], [facility] transferred pt's MA and SNAP from [address] [place] [address] to [facility], [facility] also applied for subsidized housing on behalf of pt. No further outreach is scheduled.

## 2023-09-07 ENCOUNTER — PATIENT OUTREACH (OUTPATIENT)
Dept: FAMILY MEDICINE CLINIC | Facility: CLINIC | Age: 53
End: 2023-09-07

## 2023-09-07 NOTE — PROGRESS NOTES
JOAQUÍN CM received IB message from [facility] regarding assistance patient apply for [employer] services, transferred MA  And SNAP benefits from [address] [place] [address] to [facility] [place]. CMOC was able to applied for [employer] services and transferred benefits from [address] [place] [address] to [address] [place] [address] also applied for housing for pt. [facility] was not able to apply for for a free phone it shows up as pt being active with services. Pt again denied and was informed by the vendor to wait a couple of months before reapplying. [facility] encouraged pt to go to [address] [place] [address] on any Wednesday to apply with an [employer] vendor once his three months have passed. Referral closed as pt's goals has been met. Will remains available for future consult as needed.

## 2023-09-13 ENCOUNTER — TELEPHONE (OUTPATIENT)
Dept: NEUROLOGY | Facility: CLINIC | Age: 53
End: 2023-09-13

## 2023-09-13 NOTE — TELEPHONE ENCOUNTER
Pt is currently at [facility] and forgot about his appt today and did not set up transportation for appt at 10:00 with Dr. Paris Noyola. R/s appt to 10/5 @ 1:30 with Dr. Paris Noyola in [facility].

## 2023-10-02 ENCOUNTER — OFFICE VISIT (OUTPATIENT)
Dept: FAMILY MEDICINE CLINIC | Facility: CLINIC | Age: 53
End: 2023-10-02

## 2023-10-02 VITALS
WEIGHT: 204 LBS | SYSTOLIC BLOOD PRESSURE: 130 MMHG | BODY MASS INDEX: 30.21 KG/M2 | OXYGEN SATURATION: 99 % | DIASTOLIC BLOOD PRESSURE: 80 MMHG | HEIGHT: 69 IN | TEMPERATURE: 98.7 F | RESPIRATION RATE: 16 BRPM | HEART RATE: 74 BPM

## 2023-10-02 DIAGNOSIS — R05.9 COUGH IN ADULT PATIENT: Primary | ICD-10-CM

## 2023-10-02 DIAGNOSIS — F25.8 OTHER SCHIZOAFFECTIVE DISORDERS (HCC): ICD-10-CM

## 2023-10-02 DIAGNOSIS — I10 PRIMARY HYPERTENSION: ICD-10-CM

## 2023-10-02 PROCEDURE — 99213 OFFICE O/P EST LOW 20 MIN: CPT | Performed by: FAMILY MEDICINE

## 2023-10-02 RX ORDER — BENZONATATE 100 MG/1
100 CAPSULE ORAL 3 TIMES DAILY PRN
Qty: 20 CAPSULE | Refills: 0 | Status: SHIPPED | OUTPATIENT
Start: 2023-10-02

## 2023-10-02 RX ORDER — QUETIAPINE FUMARATE 300 MG/1
300 TABLET, FILM COATED ORAL
Qty: 90 TABLET | Refills: 0 | Status: SHIPPED | OUTPATIENT
Start: 2023-10-02 | End: 2023-12-31

## 2023-10-02 RX ORDER — TRIFLUOPERAZINE HYDROCHLORIDE 2 MG/1
2 TABLET, FILM COATED ORAL DAILY
Qty: 90 TABLET | Refills: 0 | Status: SHIPPED | OUTPATIENT
Start: 2023-10-02 | End: 2023-12-31

## 2023-10-02 RX ORDER — HYDROCHLOROTHIAZIDE 12.5 MG/1
12.5 CAPSULE, GELATIN COATED ORAL DAILY
Qty: 90 CAPSULE | Refills: 0 | Status: SHIPPED | OUTPATIENT
Start: 2023-10-02 | End: 2023-12-31

## 2023-10-02 RX ORDER — HYDROXYZINE 50 MG/1
50 TABLET, FILM COATED ORAL 3 TIMES DAILY PRN
Qty: 90 TABLET | Refills: 0 | Status: SHIPPED | OUTPATIENT
Start: 2023-10-02 | End: 2023-12-31

## 2023-10-02 RX ORDER — ESCITALOPRAM OXALATE 20 MG/1
20 TABLET ORAL DAILY
Qty: 90 TABLET | Refills: 0 | Status: SHIPPED | OUTPATIENT
Start: 2023-10-02 | End: 2023-12-31

## 2023-10-02 RX ORDER — DOXEPIN HYDROCHLORIDE 50 MG/1
50 CAPSULE ORAL DAILY
Qty: 90 CAPSULE | Refills: 0 | Status: SHIPPED | OUTPATIENT
Start: 2023-10-02 | End: 2023-12-31

## 2023-10-02 RX ORDER — QUETIAPINE FUMARATE 200 MG/1
200 TABLET, FILM COATED ORAL DAILY
Qty: 90 TABLET | Refills: 0 | Status: SHIPPED | OUTPATIENT
Start: 2023-10-02 | End: 2023-12-31

## 2023-10-02 RX ORDER — TRIFLUOPERAZINE HYDROCHLORIDE 2 MG/1
2 TABLET, FILM COATED ORAL DAILY
Qty: 90 TABLET | Refills: 0 | Status: CANCELLED | OUTPATIENT
Start: 2023-10-02 | End: 2023-12-31

## 2023-10-02 NOTE — ASSESSMENT & PLAN NOTE
ASSESSMENT:  Flu-like symptoms  Appears moderately ill but not toxic; temperature as noted in vitals. PLAN:  - Symptomatic therapy suggested: rest, increase fluids, gargle prn for sore throat, use mist of vaporizer prn, OTC acetaminophen, ibuprofen, cough suppressant of choice, Robitussin CF   - call prn if symptoms persist or worsen. Call or return to clinic prn if these symptoms worsen or fail to improve as anticipated.

## 2023-10-02 NOTE — PROGRESS NOTES
Name: Dixon Feng      : 1970      MRN: [id number]  Encounter Provider: Monica Bruner MD  Encounter Date: 10/2/2023   Encounter department: [facility]     1. Cough in adult patient  Assessment & Plan:  ASSESSMENT:  Flu-like symptoms  Appears moderately ill but not toxic; temperature as noted in vitals. PLAN:  - Symptomatic therapy suggested: rest, increase fluids, gargle prn for sore throat, use mist of vaporizer prn, OTC acetaminophen, ibuprofen, cough suppressant of choice, Robitussin CF   - call prn if symptoms persist or worsen. Call or return to clinic prn if these symptoms worsen or fail to improve as anticipated. Orders:  -     benzonatate (TESSALON PERLES) 100 mg capsule; Take 1 capsule (100 mg total) by mouth 3 (three) times a day as needed for cough    2. Other schizoaffective disorders ([address])  Comments:  Requested refills as he is out of meds. Advised to see pcp as soon as possible. Refilled meds  Orders:  -     QUEtiapine (SEROquel) 200 mg tablet; Take 1 tablet (200 mg total) by mouth in the morning  -     QUEtiapine (SEROquel) 300 mg tablet; Take 1 tablet (300 mg total) by mouth daily at bedtime  -     hydrOXYzine HCL (ATARAX) 50 mg tablet; Take 1 tablet (50 mg total) by mouth 3 (three) times a day as needed for anxiety  -     doxepin (SINEquan) 50 mg capsule; Take 1 capsule (50 mg total) by mouth in the morning  -     escitalopram (LEXAPRO) 20 mg tablet; Take 1 tablet (20 mg total) by mouth daily  -     trifluoperazine (STELAZINE) 2 mg tablet; Take 1 tablet (2 mg total) by mouth in the morning    3. Primary hypertension  -     hydrochlorothiazide (MICROZIDE) 12.5 mg capsule;  Take 1 capsule (12.5 mg total) by mouth in the morning         Subjective      Patient is Greek speaking and requires interpretation #[id number]    Dixon Link is a 46 y.o. male who present complaining of flu-like symptoms: fevers, chills, congestion, sore throat and cough with increased yellow phlegm for 5 days. Denies dyspnea or wheezing, myalgias. He endorses a sick contact in a program he attends where about 2-3 people have similar symptoms. Patient's medical conditions are stable unless noted otherwise above. Patient has not had any recent hospitalizations, or medical emergencies since last visit. Patient has no further complaints other than what is mentioned in the ROS. Review of Systems   Constitutional: Positive for chills and fever. HENT: Positive for congestion and sore throat. Respiratory: Positive for cough. Negative for chest tightness, shortness of breath and wheezing.         Current Outpatient Medications on File Prior to Visit   Medication Sig   • Aspirin Low Dose 81 MG EC tablet Take 1 tablet (81 mg total) by mouth daily   • atorvastatin (LIPITOR) 40 mg tablet Take 1 tablet (40 mg total) by mouth daily   • cloNIDine (CATAPRES) 0.1 mg tablet Take 1 tablet (0.1 mg total) by mouth in the morning   • diclofenac sodium (VOLTAREN) 50 mg EC tablet Take 1 tablet (50 mg total) by mouth 2 (two) times a day   • dicyclomine (BENTYL) 10 mg/5 mL oral solution Take 20 mg by mouth 4 (four) times a day (before meals and at bedtime)   • divalproex sodium (DEPAKOTE ER) 500 mg 24 hr tablet Take 1 tablet (500 mg total) by mouth daily   • docusate sodium (COLACE) 100 mg capsule Take 1 capsule (100 mg total) by mouth 2 (two) times a day as needed for constipation   • furosemide (LASIX) 20 mg tablet Take 1 tablet (20 mg total) by mouth 2 (two) times a day   • ibuprofen (MOTRIN) 600 mg tablet Take by mouth every 6 (six) hours as needed   • naloxone (NARCAN) 4 mg/0.1 mL nasal spray 0.1 mL (4 mg total) into each nostril every 3 (three) minutes as needed for opioid reversal or respiratory depression   • prochlorperazine (COMPAZINE) 5 mg tablet Take 5 mg by mouth every 6 (six) hours as needed   • [DISCONTINUED] doxepin (SINEquan) 50 mg capsule Take 1 capsule (50 mg total) by mouth in the morning   • [DISCONTINUED] escitalopram (LEXAPRO) 20 mg tablet Take 1 tablet (20 mg total) by mouth daily   • [DISCONTINUED] hydrochlorothiazide (MICROZIDE) 12.5 mg capsule Take 1 capsule (12.5 mg total) by mouth in the morning   • [DISCONTINUED] hydrOXYzine HCL (ATARAX) 50 mg tablet Take 1 tablet (50 mg total) by mouth 3 (three) times a day as needed for anxiety   • [DISCONTINUED] QUEtiapine (SEROquel) 200 mg tablet Take 1 tablet (200 mg total) by mouth in the morning   • [DISCONTINUED] QUEtiapine (SEROquel) 300 mg tablet Take 1 tablet (300 mg total) by mouth daily at bedtime   • [DISCONTINUED] trifluoperazine (STELAZINE) 2 mg tablet Take 1 tablet (2 mg total) by mouth in the morning       Objective     /80 (BP Location: Right arm, Patient Position: Sitting, Cuff Size: Standard)   Pulse 74   Temp 98.7 °F (37.1 °C) (Temporal)   Resp 16   Ht 5' 9" (1.753 m)   Wt 92.5 kg (204 lb)   SpO2 99%   BMI 30.13 kg/m²     Physical Exam  Vitals reviewed. Constitutional:       General: He is not in acute distress. Appearance: Normal appearance. He is obese. He is not ill-appearing or toxic-appearing. HENT:      Right Ear: Tympanic membrane and ear canal normal.      Left Ear: Tympanic membrane and ear canal normal.      Ears:      Comments: Ears normal. Throat and pharynx normal.  Neck supple. No adenopathy in the neck. Sinuses non tender. Nose: Congestion present. No rhinorrhea. Mouth/Throat:      Mouth: Mucous membranes are moist.      Pharynx: No oropharyngeal exudate or posterior oropharyngeal erythema. Eyes:      General: No scleral icterus. Cardiovascular:      Rate and Rhythm: Normal rate and regular rhythm. Pulses: Normal pulses. Heart sounds: Normal heart sounds. Pulmonary:      Effort: Pulmonary effort is normal. No respiratory distress. Breath sounds: Normal breath sounds. No wheezing.    Chest:      Chest wall: No tenderness. Abdominal:      Palpations: Abdomen is soft. Musculoskeletal:      Cervical back: Neck supple. No tenderness. Lymphadenopathy:      Cervical: No cervical adenopathy. Skin:     General: Skin is warm. Findings: No rash. Neurological:      General: No focal deficit present. Mental Status: He is alert.    Psychiatric:         Behavior: Behavior normal.       Karli Moraes MD

## 2023-10-05 ENCOUNTER — CONSULT (OUTPATIENT)
Dept: NEUROLOGY | Facility: CLINIC | Age: 53
End: 2023-10-05
Payer: COMMERCIAL

## 2023-10-05 VITALS
HEART RATE: 78 BPM | WEIGHT: 202.8 LBS | BODY MASS INDEX: 30.04 KG/M2 | DIASTOLIC BLOOD PRESSURE: 80 MMHG | HEIGHT: 69 IN | TEMPERATURE: 98 F | SYSTOLIC BLOOD PRESSURE: 122 MMHG

## 2023-10-05 DIAGNOSIS — R53.1 RIGHT SIDED WEAKNESS: ICD-10-CM

## 2023-10-05 DIAGNOSIS — E78.5 HYPERLIPIDEMIA, UNSPECIFIED HYPERLIPIDEMIA TYPE: ICD-10-CM

## 2023-10-05 DIAGNOSIS — Z86.73 HISTORY OF STROKE: Primary | ICD-10-CM

## 2023-10-05 DIAGNOSIS — D18.00 CAVERNOUS ANGIOMA: ICD-10-CM

## 2023-10-05 DIAGNOSIS — I10 PRIMARY HYPERTENSION: ICD-10-CM

## 2023-10-05 DIAGNOSIS — I63.9 CEREBROVASCULAR ACCIDENT (CVA), UNSPECIFIED MECHANISM (HCC): ICD-10-CM

## 2023-10-05 PROCEDURE — 99244 OFF/OP CNSLTJ NEW/EST MOD 40: CPT | Performed by: PSYCHIATRY & NEUROLOGY

## 2023-10-05 RX ORDER — ATORVASTATIN CALCIUM 80 MG/1
80 TABLET, FILM COATED ORAL DAILY
Qty: 90 TABLET | Refills: 1 | Status: SHIPPED | OUTPATIENT
Start: 2023-10-05

## 2023-10-05 NOTE — PROGRESS NOTES
Patient ID: Beth Wilde is a 46 y.o. male who presents to the [address] [place] [address]. Assessment/Plan:   Patient Instructions   Stroke: Valjean Dubin presents for a follow-up evaluation with regard to a reported history of stroke. I was able to review the results of the imaging from [facility] which did not reveal a clear evidence for stroke based on the report, however his symptoms are potentially consistent with a small stroke and it is possible that this escapes view of the radiologist.  -We will directly request the images from \[facility]\"" so that I can review them  -In the interim it is appropriate to continue to treat this like stroke which would mean secondary prevention including aspirin, Lipitor, and appropriate blood pressure and glycemic control  -Given that he has a history of stroke and an elevated cholesterol we would recommend increasing Lipitor to 80 mg at this time. We will repeat his cholesterol panel in 3 months. We will target an LDL cholesterol of less than 70  -We will defer to his primary care team to monitor his blood sugar numbers and recommend a hemoglobin A1c of less than 7%, and in the future we will plan for them to monitor his cholesterol numbers as well  -I would like for him to occasionally check his blood pressure away from the doctor's office and make sure it is less than 130/80 most of the time. He can use the free blood pressure cuff in any pharmacy or grocery store  -I would like for him to remain physically active inasmuch as he feels capable of doing so  -At this point he is not in need of repeat imaging however that may change based on our review of the MRIs. I will plan for him to return to the office in 6 months to see either myself or one of the stroke advanced practice providers in the [address] [place] [address].   If he were to have strokelike symptoms such as sudden painless loss of vision or double vision, sudden difficulty speaking or swallowing, vertigo/room spinning that does not quickly resolve, or sudden weakness/numbness/loss of coordination affecting 1 side of the face or body he should proceed by ambulance to the nearest emergency room immediately. Diagnoses and all orders for this visit:    History of stroke    Cerebrovascular accident (CVA), unspecified mechanism ([address])  -     Ambulatory Referral to Neurology    Cavernous angioma  -     Ambulatory Referral to Neurology    Primary hypertension    Right sided weakness    Hyperlipidemia, unspecified hyperlipidemia type  -     atorvastatin (LIPITOR) 80 mg tablet; Take 1 tablet (80 mg total) by mouth daily  -     Comprehensive metabolic panel; Future  -     Lipid Panel with Direct LDL reflex; Future           Subjective:    HPI    Luz David is a 46 y.o. male who presents for evaluation of stroke. He is Dominican-speaking only and  number [id number] was utilized    He reports that 4 years ago he had a stroke that affected his left hand side. His only residual symptom at this time is left-sided numbness. He notes that he may get episodes of bilateral facial numbness or bilateral hand numbness left worse than right. He has had 3 or 4 MRIs at [facility] over the last several years. All of them would suggest that there is a cavernous malformation in the left hypothalamus but none of them documented diffusion restriction consistent with acute ischemic stroke. It is possible that this was such a tiny stroke that may have been missed on 1 of these MRIs or it may be an MRI negative stroke. The symptoms would not be related to that cavernoma based on the location. He is transitioning care as he recently moved into the region. He notes that he had a headache a few days ago which is unusual for him but has no headache currently.     Stroke risk factors were evaluated including:   Lab Results   Component Value Date/Time    CHOLESTEROL 210 (H) 08/16/2023 09:35 AM     Lab Results Component Value Date/Time    TRIG 282 (H) 08/16/2023 09:35 AM     Lab Results   Component Value Date/Time    HDL 29 (L) 08/16/2023 09:35 AM     Lab Results   Component Value Date/Time    LDLCALC 138 (H) 08/16/2023 09:35 AM       Lab Results   Component Value Date/Time    HGBA1C 5.0 08/16/2023 09:35 AM    HGBA1C 5.1 01/21/2023 06:12 AM     Lab Results   Component Value Date/Time     01/21/2023 06:12 AM           Past Medical History:   Diagnosis Date   • Cavernous angioma 5/5/2022   • 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 Added automatically from request for surgery [id number]   • Hep C w/o coma, chronic ([address]) 10/4/2018   • Nocturnal headaches 5/5/2022   • Opiate dependence ([address]) 7/11/2021    Formatting of this note might be different from the original. Started suboxone   • Other schizoaffective disorders ([address]) 6/6/2023   • Right sided weakness 12/17/2021   • Spasm 5/5/2022   • Stroke [facility])        Current Outpatient Medications:   •  Aspirin Low Dose 81 MG EC tablet, Take 1 tablet (81 mg total) by mouth daily, Disp: 90 tablet, Rfl: 0  •  atorvastatin (LIPITOR) 80 mg tablet, Take 1 tablet (80 mg total) by mouth daily, Disp: 90 tablet, Rfl: 1  •  benzonatate (TESSALON PERLES) 100 mg capsule, Take 1 capsule (100 mg total) by mouth 3 (three) times a day as needed for cough, Disp: 20 capsule, Rfl: 0  •  cloNIDine (CATAPRES) 0.1 mg tablet, Take 1 tablet (0.1 mg total) by mouth in the morning, Disp: 90 tablet, Rfl: 0  •  diclofenac sodium (VOLTAREN) 50 mg EC tablet, Take 1 tablet (50 mg total) by mouth 2 (two) times a day, Disp: 180 tablet, Rfl: 0  •  dicyclomine (BENTYL) 10 mg/5 mL oral solution, Take 20 mg by mouth 4 (four) times a day (before meals and at bedtime), Disp: , Rfl:   •  divalproex sodium (DEPAKOTE ER) 500 mg 24 hr tablet, Take 1 tablet (500 mg total) by mouth daily, Disp: 90 tablet, Rfl: 0  •  docusate sodium (COLACE) 100 mg capsule, Take 1 capsule (100 mg total) by mouth 2 (two) times a day as needed for constipation, Disp: 180 capsule, Rfl: 0  •  doxepin (SINEquan) 50 mg capsule, Take 1 capsule (50 mg total) by mouth in the morning, Disp: 90 capsule, Rfl: 0  •  escitalopram (LEXAPRO) 20 mg tablet, Take 1 tablet (20 mg total) by mouth daily, Disp: 90 tablet, Rfl: 0  •  furosemide (LASIX) 20 mg tablet, Take 1 tablet (20 mg total) by mouth 2 (two) times a day, Disp: 180 tablet, Rfl: 0  •  hydrochlorothiazide (MICROZIDE) 12.5 mg capsule, Take 1 capsule (12.5 mg total) by mouth in the morning, Disp: 90 capsule, Rfl: 0  •  hydrOXYzine HCL (ATARAX) 50 mg tablet, Take 1 tablet (50 mg total) by mouth 3 (three) times a day as needed for anxiety, Disp: 90 tablet, Rfl: 0  •  naloxone (NARCAN) 4 mg/0.1 mL nasal spray, 0.1 mL (4 mg total) into each nostril every 3 (three) minutes as needed for opioid reversal or respiratory depression, Disp: 2 each, Rfl: 2  •  prochlorperazine (COMPAZINE) 5 mg tablet, Take 5 mg by mouth every 6 (six) hours as needed, Disp: , Rfl:   •  QUEtiapine (SEROquel) 200 mg tablet, Take 1 tablet (200 mg total) by mouth in the morning, Disp: 90 tablet, Rfl: 0  •  QUEtiapine (SEROquel) 300 mg tablet, Take 1 tablet (300 mg total) by mouth daily at bedtime, Disp: 90 tablet, Rfl: 0  •  trifluoperazine (STELAZINE) 2 mg tablet, Take 1 tablet (2 mg total) by mouth in the morning, Disp: 90 tablet, Rfl: 0  •  ibuprofen (MOTRIN) 600 mg tablet, Take by mouth every 6 (six) hours as needed (Patient not taking: Reported on 10/5/2023), Disp: , Rfl:      Objective:    Blood pressure 122/80, pulse 78, temperature 98 °F (36.7 °C), temperature source Temporal, height 5' 9" (1.753 m), weight 92 kg (202 lb 12.8 oz). Neurological Exam    At the time of my examination he was awake and alert and in no distress.   There is no obvious dysarthria although he does have some poor dentition which affects his speech. Cranial nerves II through XII are symmetrically intact bilaterally with the exception of decreased sensation in the left face to both temperature and light touch and decreased hearing on the left compared with the right which he says is baseline. Strength was reasonably symmetric in the bilateral upper and lower extremities. Sensation was diminished on the left compared with the right to vibration and temperature in both the upper and lower extremities. There was no drift. Finger-nose reveals intact proprioceptive function with no dysmetria or ataxia. He is able to rise easily without assistance and his gait was stable. Reflexes were absent at the level of the biceps and patella but 2+ at the brachialis. ROS:    Review of Systems   Constitutional: Negative for appetite change, fatigue and fever. HENT: Negative. Negative for hearing loss, tinnitus, trouble swallowing and voice change. Eyes: Positive for visual disturbance (blurred vision with headaches). Negative for photophobia and pain. Respiratory: Negative. Negative for shortness of breath. Cardiovascular: Negative. Negative for palpitations. Gastrointestinal: Negative. Negative for nausea and vomiting. Endocrine: Negative. Negative for cold intolerance. Genitourinary: Negative. Negative for dysuria, frequency and urgency. Musculoskeletal: Negative for back pain, gait problem, myalgias and neck pain. Skin: Negative. Negative for rash. Allergic/Immunologic: Negative. Neurological: Positive for headaches. Negative for dizziness, tremors, seizures, syncope, facial asymmetry, speech difficulty, weakness, light-headedness and numbness. Hematological: Negative. Does not bruise/bleed easily. Psychiatric/Behavioral: Negative. Negative for confusion, hallucinations and sleep disturbance.        I have spent a total time of 39 minutes on 10/05/23 in caring for this patient including Diagnostic results, Risks and benefits of tx options, Instructions for management, Patient and family education, Risk factor reductions, Impressions, Counseling / Coordination of care, Documenting in the medical record, Reviewing / ordering tests, medicine, procedures   and Obtaining or reviewing history  .

## 2023-10-05 NOTE — PATIENT INSTRUCTIONS
Stroke: Anup Lux presents for a follow-up evaluation with regard to a reported history of stroke. I was able to review the results of the imaging from [facility] which did not reveal a clear evidence for stroke based on the report, however his symptoms are potentially consistent with a small stroke and it is possible that this escapes view of the radiologist.  -We will directly request the images from [facility] so that I can review them  -In the interim it is appropriate to continue to treat this like stroke which would mean secondary prevention including aspirin, Lipitor, and appropriate blood pressure and glycemic control  -Given that he has a history of stroke and an elevated cholesterol we would recommend increasing Lipitor to 80 mg at this time. We will repeat his cholesterol panel in 3 months. We will target an LDL cholesterol of less than 70  -We will defer to his primary care team to monitor his blood sugar numbers and recommend a hemoglobin A1c of less than 7%, and in the future we will plan for them to monitor his cholesterol numbers as well  -I would like for him to occasionally check his blood pressure away from the doctor's office and make sure it is less than 130/80 most of the time. He can use the free blood pressure cuff in any pharmacy or grocery store  -I would like for him to remain physically active inasmuch as he feels capable of doing so  -At this point he is not in need of repeat imaging however that may change based on our review of the MRIs. I will plan for him to return to the office in 6 months to see either myself or one of the stroke advanced practice providers in the [address] [place] [address].   If he were to have strokelike symptoms such as sudden painless loss of vision or double vision, sudden difficulty speaking or swallowing, vertigo/room spinning that does not quickly resolve, or sudden weakness/numbness/loss of coordination affecting 1 side of the face or body he should proceed by ambulance to the nearest emergency room immediately.

## 2023-10-16 ENCOUNTER — OFFICE VISIT (OUTPATIENT)
Dept: FAMILY MEDICINE CLINIC | Facility: CLINIC | Age: 53
End: 2023-10-16

## 2023-10-16 VITALS
BODY MASS INDEX: 30.81 KG/M2 | HEIGHT: 69 IN | WEIGHT: 208 LBS | HEART RATE: 69 BPM | TEMPERATURE: 97.7 F | RESPIRATION RATE: 18 BRPM | OXYGEN SATURATION: 97 % | SYSTOLIC BLOOD PRESSURE: 122 MMHG | DIASTOLIC BLOOD PRESSURE: 84 MMHG

## 2023-10-16 DIAGNOSIS — J06.9 UPPER RESPIRATORY TRACT INFECTION, UNSPECIFIED TYPE: Primary | ICD-10-CM

## 2023-10-16 RX ORDER — SENNOSIDES 8.6 MG
1300 CAPSULE ORAL EVERY 8 HOURS PRN
Qty: 30 TABLET | Refills: 0 | Status: SHIPPED | OUTPATIENT
Start: 2023-10-16

## 2023-10-16 NOTE — ASSESSMENT & PLAN NOTE
Not managed well symptomatically. We will prescribe Robitussin-PE. We will also order flu and COVID for completion sake. We will have patient follow-up in a few days to assess resolution of symptoms.

## 2023-10-16 NOTE — PROGRESS NOTES
Assessment/Plan:    1. Upper respiratory tract infection, unspecified type  Assessment & Plan:  Not managed well symptomatically. We will prescribe Robitussin-PE. We will also order flu and COVID for completion sake. We will have patient follow-up in a few days to assess resolution of symptoms. Orders:  -     Covid/Flu- Office Collect  -     acetaminophen (TYLENOL) 650 mg CR tablet; Take 2 tablets (1,300 mg total) by mouth every 8 (eight) hours as needed for mild pain  -     pseudoephedrine-dextromethorphan-guaifenesin (ROBITUSSIN-PE) 30- MG/5ML solution; Take 10 mL by mouth 4 (four) times a day as needed for allergies  -     Strep A PCR; Future         Subjective:      Patient ID: Jude Marcos is a 46 y.o. male. Past medical history of recent URI. Patient was instructed to  medication including Tessalon Perles to help symptomatic management. Patient has not picked up the medication. Patient currently complains of congestion, rhinorrhea, and subjective fevers. Patient denies any sick contacts. Patient denies any recent travels. The following portions of the patient's history were reviewed and updated as appropriate: allergies, current medications, past family history, past medical history, past social history, past surgical history, and problem list.    Review of Systems   Constitutional:  Negative for chills and fever. HENT:  Positive for congestion, rhinorrhea and sore throat. Negative for ear pain. Eyes:  Negative for pain and visual disturbance. Respiratory:  Negative for cough and shortness of breath. Cardiovascular:  Negative for chest pain and palpitations. Gastrointestinal:  Negative for abdominal pain and vomiting. Genitourinary:  Negative for dysuria and hematuria. Musculoskeletal:  Negative for arthralgias and back pain. Skin:  Negative for color change and rash. Neurological:  Negative for seizures and syncope.    All other systems reviewed and are negative. Objective:      /84 (BP Location: Left arm, Patient Position: Sitting, Cuff Size: Standard)   Pulse 69   Temp 97.7 °F (36.5 °C) (Temporal)   Resp 18   Ht 5' 9" (1.753 m)   Wt 94.3 kg (208 lb)   SpO2 97%   BMI 30.72 kg/m²          Physical Exam  Constitutional:       General: He is not in acute distress. Appearance: Normal appearance. He is not toxic-appearing or diaphoretic. HENT:      Head: Normocephalic. Nose: Congestion and rhinorrhea present. Mouth/Throat:      Mouth: Mucous membranes are moist.   Eyes:      Extraocular Movements: Extraocular movements intact. Pupils: Pupils are equal, round, and reactive to light. Cardiovascular:      Rate and Rhythm: Normal rate and regular rhythm. Pulses: Normal pulses. Heart sounds: Normal heart sounds. Pulmonary:      Effort: Pulmonary effort is normal. No tachypnea. Breath sounds: Normal breath sounds. No wheezing or rales. Chest:      Chest wall: No edema. There is no dullness to percussion. Abdominal:      General: Abdomen is flat. There is no distension. Palpations: Abdomen is soft. Tenderness: There is no abdominal tenderness. Musculoskeletal:      Right lower leg: No edema. Left lower leg: No edema. Skin:     Capillary Refill: Capillary refill takes less than 2 seconds. Neurological:      General: No focal deficit present. Mental Status: He is alert and oriented to person, place, and time.    Psychiatric:         Mood and Affect: Mood normal.         Behavior: Behavior normal.           Janee Hernandez DO   Family Medicine PGY-2  10/16/2023

## 2023-10-17 ENCOUNTER — TELEPHONE (OUTPATIENT)
Dept: NEUROLOGY | Facility: CLINIC | Age: 53
End: 2023-10-17

## 2023-10-17 NOTE — TELEPHONE ENCOUNTER
Received 2 imaging discs from [facility]. Gave to Jessica SILVA for scanning because next f/u is with him.

## 2023-10-17 NOTE — TELEPHONE ENCOUNTER
Placed call to patient, I spoke to  in the facility he resides at. They requested to have the Discs mailed to patient. Discs have been mailed.

## 2023-10-17 NOTE — TELEPHONE ENCOUNTER
Dr. Jorge Malloy these are the images you were waiting for from [facility]. They should be uploaded if you can take a look to make sure everything is there that was needed.

## 2023-10-19 ENCOUNTER — OFFICE VISIT (OUTPATIENT)
Dept: FAMILY MEDICINE CLINIC | Facility: CLINIC | Age: 53
End: 2023-10-19

## 2023-10-19 VITALS
WEIGHT: 211 LBS | HEIGHT: 69 IN | DIASTOLIC BLOOD PRESSURE: 62 MMHG | OXYGEN SATURATION: 97 % | HEART RATE: 80 BPM | RESPIRATION RATE: 16 BRPM | BODY MASS INDEX: 31.25 KG/M2 | TEMPERATURE: 96.8 F | SYSTOLIC BLOOD PRESSURE: 102 MMHG

## 2023-10-19 DIAGNOSIS — J06.9 UPPER RESPIRATORY TRACT INFECTION, UNSPECIFIED TYPE: Primary | ICD-10-CM

## 2023-10-19 PROCEDURE — 99213 OFFICE O/P EST LOW 20 MIN: CPT | Performed by: FAMILY MEDICINE

## 2023-10-23 NOTE — PROGRESS NOTES
Assessment/Plan:    1. Upper respiratory tract infection, unspecified type  Comments:  Resolved with Robitussin-PE. Expressed to to the patient the importance of hand hygiene. Gave patient return precautions. Subjective:      Patient ID: Bravo Bueno is a 46 y.o. male. Patient coming in for follow-up visit for cold-like symptoms. I seen this patient on same day where he had congestion and runny nose. Patient was prescribed Robitussin-PE and reports that since taking the medication he feels significantly better. Patient denies any congestion at this time. Patient denies any runny nose. Patient overall feels well. The following portions of the patient's history were reviewed and updated as appropriate: allergies, current medications, past family history, past medical history, past social history, past surgical history, and problem list.    Review of Systems   Constitutional:  Negative for chills and fever. HENT:  Negative for ear pain and sore throat. Eyes:  Negative for pain and visual disturbance. Respiratory:  Negative for cough and shortness of breath. Cardiovascular:  Negative for chest pain and palpitations. Gastrointestinal:  Negative for abdominal pain and vomiting. Genitourinary:  Negative for dysuria and hematuria. Musculoskeletal:  Negative for arthralgias and back pain. Skin:  Negative for color change and rash. Neurological:  Negative for seizures and syncope. All other systems reviewed and are negative. Objective:      /62 (BP Location: Left arm, Patient Position: Sitting, Cuff Size: Standard)   Pulse 80   Temp (!) 96.8 °F (36 °C) (Temporal)   Resp 16   Ht 5' 9" (1.753 m)   Wt 95.7 kg (211 lb)   SpO2 97%   BMI 31.16 kg/m²          Physical Exam  Constitutional:       General: He is not in acute distress. Appearance: Normal appearance. He is not toxic-appearing or diaphoretic. HENT:      Head: Normocephalic. Mouth/Throat:      Mouth: Mucous membranes are moist.   Eyes:      Extraocular Movements: Extraocular movements intact. Pupils: Pupils are equal, round, and reactive to light. Cardiovascular:      Rate and Rhythm: Normal rate and regular rhythm. Pulmonary:      Effort: Pulmonary effort is normal. No tachypnea. Breath sounds: Normal breath sounds. No wheezing or rales. Chest:      Chest wall: No edema. There is no dullness to percussion. Abdominal:      General: Abdomen is flat. There is no distension. Palpations: Abdomen is soft. Tenderness: There is no abdominal tenderness. Musculoskeletal:      Right lower leg: No edema. Left lower leg: No edema. Skin:     Capillary Refill: Capillary refill takes less than 2 seconds. Neurological:      General: No focal deficit present. Mental Status: He is alert and oriented to person, place, and time.    Psychiatric:         Mood and Affect: Mood normal.         Behavior: Behavior normal.           Kaushik Crespo DO   Family Medicine PGY-3  10/23/2023

## 2023-11-02 ENCOUNTER — TELEPHONE (OUTPATIENT)
Dept: FAMILY MEDICINE CLINIC | Facility: CLINIC | Age: 53
End: 2023-11-02

## 2023-11-02 NOTE — TELEPHONE ENCOUNTER
11/02/23    Pt left message requesting an appt       Spoke to Pt     Informed Pt he reasons for the call    Pt marissa 11/14/23

## 2023-11-14 ENCOUNTER — OFFICE VISIT (OUTPATIENT)
Dept: FAMILY MEDICINE CLINIC | Facility: CLINIC | Age: 53
End: 2023-11-14

## 2023-11-14 VITALS
DIASTOLIC BLOOD PRESSURE: 94 MMHG | BODY MASS INDEX: 31.7 KG/M2 | OXYGEN SATURATION: 98 % | TEMPERATURE: 97.9 F | WEIGHT: 214 LBS | HEIGHT: 69 IN | SYSTOLIC BLOOD PRESSURE: 124 MMHG | RESPIRATION RATE: 18 BRPM | HEART RATE: 67 BPM

## 2023-11-14 DIAGNOSIS — F25.8 OTHER SCHIZOAFFECTIVE DISORDERS (HCC): ICD-10-CM

## 2023-11-14 DIAGNOSIS — Z86.73 HISTORY OF STROKE: Primary | ICD-10-CM

## 2023-11-14 DIAGNOSIS — F14.91 HISTORY OF COCAINE USE: ICD-10-CM

## 2023-11-14 DIAGNOSIS — F11.90 OPIOID USE DISORDER: ICD-10-CM

## 2023-11-14 DIAGNOSIS — D18.00 CAVERNOUS ANGIOMA: ICD-10-CM

## 2023-11-14 PROCEDURE — 99213 OFFICE O/P EST LOW 20 MIN: CPT | Performed by: STUDENT IN AN ORGANIZED HEALTH CARE EDUCATION/TRAINING PROGRAM

## 2023-11-14 NOTE — PROGRESS NOTES
Name: Elise Martines      : 1970      MRN: [id number]  Encounter Provider: Miguel Mendez MD  Encounter Date: 2023   Encounter department: [address] [facility] [facility]     1. History of stroke  Assessment & Plan:  Likely contributed by cocaine and opiate use  Per my physical exam has baseline left sided weakness  Per chart review is suppose to be on aspirin and statin. Follows with neurology last seen 10/5/2023 Dr Linnea Juarez: recommendations are as follows:    "Stroke: Earline Mckeon presents for a follow-up evaluation with regard to a reported history of stroke. I was able to review the results of the imaging from [facility] which did not reveal a clear evidence for stroke based on the report, however his symptoms are potentially consistent with a small stroke and it is possible that this escapes view of the radiologist.  -We will directly request the images from [facility] so that I can review them  -In the interim it is appropriate to continue to treat this like stroke which would mean secondary prevention including aspirin, Lipitor, and appropriate blood pressure and glycemic control  -Given that he has a history of stroke and an elevated cholesterol we would recommend increasing Lipitor to 80 mg at this time. We will repeat his cholesterol panel in 3 months. We will target an LDL cholesterol of less than 70  -We will defer to his primary care team to monitor his blood sugar numbers and recommend a hemoglobin A1c of less than 7%, and in the future we will plan for them to monitor his cholesterol numbers as well  -I would like for him to occasionally check his blood pressure away from the doctor's office and make sure it is less than 130/80 most of the time.   He can use the free blood pressure cuff in any pharmacy or grocery store  -I would like for him to remain physically active inasmuch as he feels capable of doing so  -At this point he is not in need of repeat imaging however that may change based on our review of the MRIs. I will plan for him to return to the office in 6 months to see either myself or one of the stroke advanced practice providers in the [address] [place] [address]. If he were to have strokelike symptoms such as sudden painless loss of vision or double vision, sudden difficulty speaking or swallowing, vertigo/room spinning that does not quickly resolve, or sudden weakness/numbness/loss of coordination affecting 1 side of the face or body he should proceed by ambulance to the nearest emergency room immediately."    As such, on f/u visit review meds, check bp, prescribe blood pressure cuff so can check at home, check HbA1C however last level of 5 in August 2023 will consult to see if necessary, as of now do not see any history in records of DM         2. History of cocaine use  Assessment & Plan:  Per record review from [facility] 1/2023  UDS in 01/2023 and 01/2020 positive for both opiates and cocaine  May still be currently using      3. Cavernous angioma  Assessment & Plan:  -stable per CT 01/2023, hypoathalamic mass consistent with cavernoma       4. Other schizoaffective disorders ([address])  Assessment & Plan:  In communications with psych resident at our clinic. Previously private messaged regarding polypharmacy and ensuring patient not using two antipsychotics at same time as increases risk of mortality and side effects. Staff message sent to have patient make follow-up appointment and bring in all his medications so we can review all medications and minimize to decrease risk of polypharmacy and multipl antipsychotics at same time.       5. Opioid use disorder  Assessment & Plan:  Per record review from [facility] 1/2023  PDMP reviewed no hx  UDS in 01/2023 and 01/2020 positive for both opiates and cocaine            Subjective      Used interpretor Perez Salgado ID # [id number]    Patient presents today with two disks from [facility] that contain records there. Patient states he has a history of three strokes  all within a few months from one another about three to four years ago. Currently not working and lives at [employer]. Per patient, the second stroke involved a brain bleed and the patient states it still remains. Patient states he has an appointment with [facility] on the 18th to get disability and this appointment is to help with that. Patient does not bring in any documentation to fill out. Today we will perform thorough neurological assessment (does have baseline deficits from prior strokes) and document such that is forms are needed in the near future they can be completed speedily. Patient states he walks with a cane but left it in his car. Additionally, patient reports chronic migraines and arthritis. Review of Systems   Neurological:  Positive for weakness.        Current Outpatient Medications on File Prior to Visit   Medication Sig    acetaminophen (TYLENOL) 650 mg CR tablet Take 2 tablets (1,300 mg total) by mouth every 8 (eight) hours as needed for mild pain    Aspirin Low Dose 81 MG EC tablet Take 1 tablet (81 mg total) by mouth daily    atorvastatin (LIPITOR) 80 mg tablet Take 1 tablet (80 mg total) by mouth daily    benzonatate (TESSALON PERLES) 100 mg capsule Take 1 capsule (100 mg total) by mouth 3 (three) times a day as needed for cough    cloNIDine (CATAPRES) 0.1 mg tablet Take 1 tablet (0.1 mg total) by mouth in the morning    diclofenac sodium (VOLTAREN) 50 mg EC tablet Take 1 tablet (50 mg total) by mouth 2 (two) times a day    dicyclomine (BENTYL) 10 mg/5 mL oral solution Take 20 mg by mouth 4 (four) times a day (before meals and at bedtime)    divalproex sodium (DEPAKOTE ER) 500 mg 24 hr tablet Take 1 tablet (500 mg total) by mouth daily    docusate sodium (COLACE) 100 mg capsule Take 1 capsule (100 mg total) by mouth 2 (two) times a day as needed for constipation    doxepin (SINEquan) 50 mg capsule Take 1 capsule (50 mg total) by mouth in the morning    escitalopram (LEXAPRO) 20 mg tablet Take 1 tablet (20 mg total) by mouth daily    furosemide (LASIX) 20 mg tablet Take 1 tablet (20 mg total) by mouth 2 (two) times a day    hydrochlorothiazide (MICROZIDE) 12.5 mg capsule Take 1 capsule (12.5 mg total) by mouth in the morning    hydrOXYzine HCL (ATARAX) 50 mg tablet Take 1 tablet (50 mg total) by mouth 3 (three) times a day as needed for anxiety    ibuprofen (MOTRIN) 600 mg tablet Take by mouth every 6 (six) hours as needed (Patient not taking: Reported on 10/5/2023)    naloxone (NARCAN) 4 mg/0.1 mL nasal spray 0.1 mL (4 mg total) into each nostril every 3 (three) minutes as needed for opioid reversal or respiratory depression    prochlorperazine (COMPAZINE) 5 mg tablet Take 5 mg by mouth every 6 (six) hours as needed    pseudoephedrine-dextromethorphan-guaifenesin (ROBITUSSIN-PE) 30- MG/5ML solution Take 10 mL by mouth 4 (four) times a day as needed for allergies    QUEtiapine (SEROquel) 200 mg tablet Take 1 tablet (200 mg total) by mouth in the morning    QUEtiapine (SEROquel) 300 mg tablet Take 1 tablet (300 mg total) by mouth daily at bedtime    trifluoperazine (STELAZINE) 2 mg tablet Take 1 tablet (2 mg total) by mouth in the morning       Objective     /94 (BP Location: Left arm, Patient Position: Sitting, Cuff Size: Standard)   Pulse 67   Temp 97.9 °F (36.6 °C) (Temporal)   Resp 18   Ht 5' 9" (1.753 m)   Wt 97.1 kg (214 lb)   SpO2 98%   BMI 31.60 kg/m²     Physical Exam  Constitutional:       Appearance: Normal appearance. He is normal weight. HENT:      Head: Normocephalic and atraumatic. Right Ear: External ear normal.      Left Ear: External ear normal.      Nose: Nose normal.      Mouth/Throat:      Mouth: Mucous membranes are moist.      Pharynx: Oropharynx is clear. Eyes:      General: No scleral icterus. Right eye: No discharge. Left eye: No discharge.       Extraocular Movements: Extraocular movements intact. Conjunctiva/sclera: Conjunctivae normal.   Cardiovascular:      Rate and Rhythm: Normal rate and regular rhythm. Pulses: Normal pulses. Heart sounds: Normal heart sounds. No murmur heard. No friction rub. No gallop. Pulmonary:      Effort: Pulmonary effort is normal.      Breath sounds: Normal breath sounds. No wheezing, rhonchi or rales. Abdominal:      General: Abdomen is flat. Bowel sounds are normal. There is no distension. Palpations: Abdomen is soft. Tenderness: There is no abdominal tenderness. Musculoskeletal:         General: Normal range of motion. Cervical back: Normal range of motion. Comments: 3+ left upper and lower extremity  5+ right upper and lower extremity     Skin:     General: Skin is warm. Capillary Refill: Capillary refill takes less than 2 seconds. Findings: No rash. Neurological:      General: No focal deficit present. Mental Status: He is alert and oriented to person, place, and time. Cranial Nerves: Cranial nerve deficit present. Motor: Weakness present. Gait: Gait abnormal.      Deep Tendon Reflexes: Reflexes abnormal.      Comments: CN 7 fasciculations including eyebrows, left cheek puffs less than right although smile normal,  -no uvula deviation  -sensation intact  -hearing intact  -slow alternating hand movements, slow finger-to-nose testing but no overshooting, unable to perform left sided heel to shin test  Hyperreflexic DTR on right knee and achilles and what appears to be decreased DTR on the left knee, achilles, bicep, and brachialis. Normal bicep and brachialis DTR on right upper extremity. Psychiatric:         Mood and Affect: Mood normal.         Behavior: Behavior normal.         Thought Content:  Thought content normal.         Judgment: Judgment normal.       Nora Shannon MD

## 2023-11-20 ENCOUNTER — TELEPHONE (OUTPATIENT)
Dept: FAMILY MEDICINE CLINIC | Facility: CLINIC | Age: 53
End: 2023-11-20

## 2023-11-20 PROBLEM — F11.90 OPIOID USE DISORDER: Status: ACTIVE | Noted: 2023-11-20

## 2023-11-20 PROBLEM — F14.91 HISTORY OF COCAINE USE: Status: ACTIVE | Noted: 2023-11-20

## 2023-11-20 NOTE — ASSESSMENT & PLAN NOTE
Per record review from [facility] 1/2023  UDS in 01/2023 and 01/2020 positive for both opiates and cocaine  May still be currently using

## 2023-11-20 NOTE — ASSESSMENT & PLAN NOTE
Likely contributed by cocaine and opiate use  Per my physical exam has baseline left sided weakness  Per chart review is suppose to be on aspirin and statin. Follows with neurology last seen 10/5/2023 Dr Donal Fabry: recommendations are as follows:    "Stroke: Anup Lux presents for a follow-up evaluation with regard to a reported history of stroke. I was able to review the results of the imaging from [facility] which did not reveal a clear evidence for stroke based on the report, however his symptoms are potentially consistent with a small stroke and it is possible that this escapes view of the radiologist.  -We will directly request the images from [facility] so that I can review them  -In the interim it is appropriate to continue to treat this like stroke which would mean secondary prevention including aspirin, Lipitor, and appropriate blood pressure and glycemic control  -Given that he has a history of stroke and an elevated cholesterol we would recommend increasing Lipitor to 80 mg at this time. We will repeat his cholesterol panel in 3 months. We will target an LDL cholesterol of less than 70  -We will defer to his primary care team to monitor his blood sugar numbers and recommend a hemoglobin A1c of less than 7%, and in the future we will plan for them to monitor his cholesterol numbers as well  -I would like for him to occasionally check his blood pressure away from the doctor's office and make sure it is less than 130/80 most of the time. He can use the free blood pressure cuff in any pharmacy or grocery store  -I would like for him to remain physically active inasmuch as he feels capable of doing so  -At this point he is not in need of repeat imaging however that may change based on our review of the MRIs. I will plan for him to return to the office in 6 months to see either myself or one of the stroke advanced practice providers in the [address] [place] [address].   If he were to have strokelike symptoms such as sudden painless loss of vision or double vision, sudden difficulty speaking or swallowing, vertigo/room spinning that does not quickly resolve, or sudden weakness/numbness/loss of coordination affecting 1 side of the face or body he should proceed by ambulance to the nearest emergency room immediately."    As such, on f/u visit review meds, check bp, prescribe blood pressure cuff so can check at home, check HbA1C however last level of 5 in August 2023 will consult to see if necessary, as of now do not see any history in records of DM

## 2023-11-20 NOTE — ASSESSMENT & PLAN NOTE
Per record review from [facility] 1/2023  PDMP reviewed no hx  UDS in 01/2023 and 01/2020 positive for both opiates and cocaine

## 2023-11-20 NOTE — TELEPHONE ENCOUNTER
----- Message from Nabil Navarrete MD sent at 11/20/2023  9:49 AM EST -----  Regarding: needs appointment  Please have patient set up another appointment with me. I would like to run through his medications. If he can bring all of his medications with him that would be asim too. Please make it a 40 minute appointment. Thanks.

## 2023-11-20 NOTE — ASSESSMENT & PLAN NOTE
/94 and slightly above goal of <140/90 per [facility] 8. Will monitor closely on next visit and repeat bp if elevated. Will also review medications. AT this time no symptoms related to mild elevation of diastolic bp.

## 2023-11-20 NOTE — ASSESSMENT & PLAN NOTE
In communications with psych resident at our clinic. Previously private messaged regarding polypharmacy and ensuring patient not using two antipsychotics at same time as increases risk of mortality and side effects. Staff message sent to have patient make follow-up appointment and bring in all his medications so we can review all medications and minimize to decrease risk of polypharmacy and multipl antipsychotics at same time.

## 2023-11-29 ENCOUNTER — OFFICE VISIT (OUTPATIENT)
Dept: FAMILY MEDICINE CLINIC | Facility: CLINIC | Age: 53
End: 2023-11-29

## 2023-11-29 VITALS
SYSTOLIC BLOOD PRESSURE: 120 MMHG | DIASTOLIC BLOOD PRESSURE: 82 MMHG | WEIGHT: 211 LBS | HEIGHT: 69 IN | HEART RATE: 74 BPM | OXYGEN SATURATION: 97 % | TEMPERATURE: 98.1 F | RESPIRATION RATE: 18 BRPM | BODY MASS INDEX: 31.25 KG/M2

## 2023-11-29 DIAGNOSIS — K64.2 GRADE III HEMORRHOIDS: Primary | ICD-10-CM

## 2023-11-29 DIAGNOSIS — B35.3 TINEA PEDIS OF BOTH FEET: ICD-10-CM

## 2023-11-29 PROCEDURE — 99213 OFFICE O/P EST LOW 20 MIN: CPT | Performed by: FAMILY MEDICINE

## 2023-11-29 RX ORDER — HYDROCORTISONE 25 MG/G
CREAM TOPICAL 2 TIMES DAILY
Qty: 28 G | Refills: 0 | Status: SHIPPED | OUTPATIENT
Start: 2023-11-29

## 2023-11-29 RX ORDER — KETOCONAZOLE 20 MG/G
CREAM TOPICAL DAILY
Qty: 60 G | Refills: 1 | Status: SHIPPED | OUTPATIENT
Start: 2023-11-29

## 2023-11-29 NOTE — PROGRESS NOTES
Name: Kevon Birmingham      : 1970      MRN: [id number]  Encounter Provider: Paula Lawrence MD  Encounter Date: 2023   Encounter department: [address] [facility],[address]     1. Grade III hemorrhoids  Assessment & Plan:  Tucks pads wot be used as needed and counseled best to use post shower and/or after using a wet wipe. M/PM application of hydrocortizone cream (preparation H) and not to be used for more than 2 weeks at a time. Educated on washing hands post applications to avoid accidentally rubbing eyes with steroids. As well counseled on benefit of high fiber diet. Finally, discussed using a foot stool when having a bowel movement in order to allow for easier, less strained passage of stool that may exacerbate the hemorrhoids. Patient expressed understanding. Orders:  -     hydrocortisone (ANUSOL-HC) 2.5 % rectal cream; Apply topically 2 (two) times a day  -     witch hazel-glycerin (TUCKS) topical pad; Insert 1 Pad into the rectum as needed for irritation    2. Tinea pedis of both feet  Assessment & Plan:  Ketoconazole cream for two weeks to be used daily and best after a shower with aggressive scrubbing off of dead skin cells. Advised patient to continue use several days post resolution of rash. .Patient may continue with the powder. On next visit discuss importance of maintaing dry environment particularly post showers. Additionally, regarding nail fungus, at this time do not think it is beneficial to start terbinafine in the setting of history of treated Hep C and ow success rate. As nails are not severely effected, maintenance of nails with the help of podiatry may be most helpful.  Discuss on next visit and place podiatry referral.     Orders:  -     ketoconazole (NIZORAL) 2 % cream; Apply topically daily        Subjective      Interpretor used [employer] ID #[id number]  47 yo male patient with hx of stroke and hx of cocaine abuse who presents today to perform a medication review. Unfortunately, today patient has not brought in his list of medications that are provided by [facility] nor does he recall what medications he takes. As such requested that patient return with a list of his medications such that we can review and update in our system and then adjust accordingly as right now there are two antipsychotic medications (seroquel and stelazine) listed and this is concerning for increased risk of mortality and side effects. The goals would be to decrease polypharmacy. This was brought up in my inbasket by our onsite psychologist who provided recommendations including obtaining collateral information. As such, today patient states he has not seen a psychiatrist in the past, that all his antipsychotic medications were provided here, and that he has an upcoming psychiatry appointment on 12/8 that [facility] arranged for him. Chart review reveals prescription of stelazine was provided 10/2/23 in our clinic. Patient will f/u in 1 week and emphasized the importance of bringing in his medications and a list of what he takes and when. Additional concerns for today include hemorrhoids. Patient states that his hemorroids sometimes prolapse but are reducible. He has noted recent fresh bloody stools and is requesting for medications to help with the pruritus. He states he does not have constipation and that he would like ot eat oatmeal daily however he is only provided with oatmeal three times a week at [facility]. Patient is requesting a note regarding healthy foods/diet that he  should be provided with and note provided,     Finally, patient reports pruritus in between his toes with dry, flaky skin. As well he reports what he thinks is fungus on his nails. Patient has a history of medically treated Hep C. He places powder in between his toes to help keep it dry.        Per previous visit patient went to the [address] office and states they are compiling hte paperwork. Reminded patient that if there is anything needed on our end we would be happy to be of service. Review of Systems   Constitutional:  Negative for fever. Cardiovascular:  Negative for palpitations. Gastrointestinal:  Negative for abdominal pain, blood in stool, constipation, diarrhea, nausea, rectal pain and vomiting. Skin:  Positive for rash (bilateral feet). Neurological:  Positive for weakness (at baseline of left side residual of prior stroke). Negative for tremors, facial asymmetry, speech difficulty and numbness.        Current Outpatient Medications on File Prior to Visit   Medication Sig    acetaminophen (TYLENOL) 650 mg CR tablet Take 2 tablets (1,300 mg total) by mouth every 8 (eight) hours as needed for mild pain    Aspirin Low Dose 81 MG EC tablet Take 1 tablet (81 mg total) by mouth daily    atorvastatin (LIPITOR) 80 mg tablet Take 1 tablet (80 mg total) by mouth daily    benzonatate (TESSALON PERLES) 100 mg capsule Take 1 capsule (100 mg total) by mouth 3 (three) times a day as needed for cough    cloNIDine (CATAPRES) 0.1 mg tablet Take 1 tablet (0.1 mg total) by mouth in the morning    diclofenac sodium (VOLTAREN) 50 mg EC tablet Take 1 tablet (50 mg total) by mouth 2 (two) times a day    dicyclomine (BENTYL) 10 mg/5 mL oral solution Take 20 mg by mouth 4 (four) times a day (before meals and at bedtime)    divalproex sodium (DEPAKOTE ER) 500 mg 24 hr tablet Take 1 tablet (500 mg total) by mouth daily    docusate sodium (COLACE) 100 mg capsule Take 1 capsule (100 mg total) by mouth 2 (two) times a day as needed for constipation    doxepin (SINEquan) 50 mg capsule Take 1 capsule (50 mg total) by mouth in the morning    escitalopram (LEXAPRO) 20 mg tablet Take 1 tablet (20 mg total) by mouth daily    furosemide (LASIX) 20 mg tablet Take 1 tablet (20 mg total) by mouth 2 (two) times a day    hydrochlorothiazide (MICROZIDE) 12.5 mg capsule Take 1 capsule (12.5 mg total) by mouth in the morning    hydrOXYzine HCL (ATARAX) 50 mg tablet Take 1 tablet (50 mg total) by mouth 3 (three) times a day as needed for anxiety    ibuprofen (MOTRIN) 600 mg tablet Take by mouth every 6 (six) hours as needed (Patient not taking: Reported on 10/5/2023)    naloxone (NARCAN) 4 mg/0.1 mL nasal spray 0.1 mL (4 mg total) into each nostril every 3 (three) minutes as needed for opioid reversal or respiratory depression    prochlorperazine (COMPAZINE) 5 mg tablet Take 5 mg by mouth every 6 (six) hours as needed    pseudoephedrine-dextromethorphan-guaifenesin (ROBITUSSIN-PE) 30- MG/5ML solution Take 10 mL by mouth 4 (four) times a day as needed for allergies    QUEtiapine (SEROquel) 200 mg tablet Take 1 tablet (200 mg total) by mouth in the morning    QUEtiapine (SEROquel) 300 mg tablet Take 1 tablet (300 mg total) by mouth daily at bedtime    trifluoperazine (STELAZINE) 2 mg tablet Take 1 tablet (2 mg total) by mouth in the morning       Objective     /82 (BP Location: Right arm, Patient Position: Sitting, Cuff Size: Standard)   Pulse 74   Temp 98.1 °F (36.7 °C) (Temporal)   Resp 18   Ht 5' 9" (1.753 m)   Wt 95.7 kg (211 lb)   SpO2 97%   BMI 31.16 kg/m²     Physical Exam  Constitutional:       Appearance: Normal appearance. He is normal weight. Comments: Presents with cane    HENT:      Head: Normocephalic and atraumatic. Right Ear: External ear normal.      Left Ear: External ear normal.      Nose: Nose normal.      Mouth/Throat:      Mouth: Mucous membranes are moist.      Pharynx: Oropharynx is clear. Eyes:      General: No scleral icterus. Right eye: No discharge. Left eye: No discharge. Extraocular Movements: Extraocular movements intact. Conjunctiva/sclera: Conjunctivae normal.   Cardiovascular:      Rate and Rhythm: Normal rate and regular rhythm. Pulses: Normal pulses. Heart sounds: Normal heart sounds. No murmur heard. No friction rub.  No gallop. Pulmonary:      Effort: Pulmonary effort is normal.      Breath sounds: Normal breath sounds. No wheezing, rhonchi or rales. Abdominal:      General: Abdomen is flat. Bowel sounds are normal. There is no distension. Palpations: Abdomen is soft. Tenderness: There is no abdominal tenderness. Genitourinary:     Comments: No anal fissures, posterior non-engorged hemorroid about 1 cm long  Musculoskeletal:      Cervical back: Normal range of motion. Skin:     General: Skin is warm. Capillary Refill: Capillary refill takes less than 2 seconds. Findings: Rash (erythematous, with well demarkated flaky skin of bilateral plantar feet and cracked skin in between bilateral toes with white powder covering) present. Comments: Slight yellowish color to toenails however attached to nail bed with minimal dysmorphism   Neurological:      General: No focal deficit present. Mental Status: He is alert and oriented to person, place, and time. Psychiatric:         Mood and Affect: Mood normal.         Behavior: Behavior normal.         Thought Content:  Thought content normal.         Judgment: Judgment normal.       Morenita Mo MD

## 2023-11-29 NOTE — LETTER
November 29, 2023     Patient: Joaquina Bah  YOB: 1970  Date of Visit: 11/29/2023      To Whom it May Concern:    Layton Sarmiento is under my professional care. Lurlean Boas was seen in my office on 11/29/2023. Lurlean Boas has a complex past medical history and requires a healthy diet including daily oatmeal, 3-5 servings of vegetables a day, lean protein such as fish and chicken, multiple servings of fruit daily. If you have any questions or concerns, please don't hesitate to call.          Sincerely,          Lana Dykes MD

## 2023-11-30 PROBLEM — K64.2 GRADE III HEMORRHOIDS: Status: ACTIVE | Noted: 2023-11-30

## 2023-11-30 PROBLEM — B35.3 TINEA PEDIS OF BOTH FEET: Status: ACTIVE | Noted: 2023-11-30

## 2023-12-01 PROBLEM — R05.9 COUGH IN ADULT PATIENT: Status: RESOLVED | Noted: 2023-10-02 | Resolved: 2023-12-01

## 2023-12-01 NOTE — ASSESSMENT & PLAN NOTE
Ketoconazole cream for two weeks to be used daily and best after a shower with aggressive scrubbing off of dead skin cells. Advised patient to continue use several days post resolution of rash. .Patient may continue with the powder. On next visit discuss importance of maintaing dry environment particularly post showers. Additionally, regarding nail fungus, at this time do not think it is beneficial to start terbinafine in the setting of history of treated Hep C and ow success rate. As nails are not severely effected, maintenance of nails with the help of podiatry may be most helpful.  Discuss on next visit and place podiatry referral.

## 2023-12-01 NOTE — ASSESSMENT & PLAN NOTE
Tucks pads wot be used as needed and counseled best to use post shower and/or after using a wet wipe. M/PM application of hydrocortizone cream (preparation H) and not to be used for more than 2 weeks at a time. Educated on washing hands post applications to avoid accidentally rubbing eyes with steroids. As well counseled on benefit of high fiber diet. Finally, discussed using a foot stool when having a bowel movement in order to allow for easier, less strained passage of stool that may exacerbate the hemorrhoids. Patient expressed understanding.

## 2023-12-06 ENCOUNTER — OFFICE VISIT (OUTPATIENT)
Dept: FAMILY MEDICINE CLINIC | Facility: CLINIC | Age: 53
End: 2023-12-06

## 2023-12-06 VITALS
TEMPERATURE: 97.8 F | WEIGHT: 211 LBS | HEART RATE: 71 BPM | SYSTOLIC BLOOD PRESSURE: 120 MMHG | OXYGEN SATURATION: 98 % | BODY MASS INDEX: 31.25 KG/M2 | HEIGHT: 69 IN | RESPIRATION RATE: 17 BRPM | DIASTOLIC BLOOD PRESSURE: 84 MMHG

## 2023-12-06 DIAGNOSIS — Q28.3 CAVERNOUS MALFORMATION: ICD-10-CM

## 2023-12-06 DIAGNOSIS — Z79.899 ENCOUNTER FOR MEDICATION REVIEW: Primary | ICD-10-CM

## 2023-12-06 DIAGNOSIS — E78.5 HYPERLIPIDEMIA, UNSPECIFIED HYPERLIPIDEMIA TYPE: ICD-10-CM

## 2023-12-06 DIAGNOSIS — F25.8 OTHER SCHIZOAFFECTIVE DISORDERS (HCC): ICD-10-CM

## 2023-12-06 DIAGNOSIS — I63.9 CEREBROVASCULAR ACCIDENT (CVA), UNSPECIFIED MECHANISM (HCC): ICD-10-CM

## 2023-12-06 DIAGNOSIS — I10 PRIMARY HYPERTENSION: ICD-10-CM

## 2023-12-08 ENCOUNTER — TELEPHONE (OUTPATIENT)
Dept: FAMILY MEDICINE CLINIC | Facility: CLINIC | Age: 53
End: 2023-12-08

## 2023-12-08 PROBLEM — Z79.899 ENCOUNTER FOR MEDICATION REVIEW: Status: ACTIVE | Noted: 2023-12-08

## 2023-12-08 PROBLEM — Q28.3 CAVERNOUS MALFORMATION: Status: ACTIVE | Noted: 2023-12-08

## 2023-12-08 RX ORDER — ATORVASTATIN CALCIUM 80 MG/1
80 TABLET, FILM COATED ORAL DAILY
Qty: 90 TABLET | Refills: 1 | Status: SHIPPED | OUTPATIENT
Start: 2023-12-08

## 2023-12-08 RX ORDER — ESCITALOPRAM OXALATE 20 MG/1
20 TABLET ORAL DAILY
Qty: 90 TABLET | Refills: 0 | Status: SHIPPED | OUTPATIENT
Start: 2023-12-08 | End: 2024-03-07

## 2023-12-08 RX ORDER — HYDROXYZINE 50 MG/1
50 TABLET, FILM COATED ORAL 3 TIMES DAILY PRN
Qty: 90 TABLET | Refills: 0 | Status: SHIPPED | OUTPATIENT
Start: 2023-12-08 | End: 2024-03-07

## 2023-12-08 RX ORDER — DOXEPIN HYDROCHLORIDE 50 MG/1
50 CAPSULE ORAL DAILY
Qty: 30 CAPSULE | Refills: 0 | Status: SHIPPED | OUTPATIENT
Start: 2023-12-08 | End: 2024-03-07

## 2023-12-08 RX ORDER — ASPIRIN 81 MG/1
81 TABLET, COATED ORAL DAILY
Qty: 90 TABLET | Refills: 0 | Status: SHIPPED | OUTPATIENT
Start: 2023-12-08 | End: 2024-03-07

## 2023-12-08 RX ORDER — QUETIAPINE FUMARATE 300 MG/1
300 TABLET, FILM COATED ORAL 2 TIMES DAILY
Qty: 180 TABLET | Refills: 0 | Status: SHIPPED | OUTPATIENT
Start: 2023-12-08 | End: 2024-03-07

## 2023-12-08 RX ORDER — HYDROCHLOROTHIAZIDE 12.5 MG/1
12.5 CAPSULE, GELATIN COATED ORAL DAILY
Qty: 90 CAPSULE | Refills: 0 | Status: SHIPPED | OUTPATIENT
Start: 2023-12-08 | End: 2024-03-07

## 2023-12-08 NOTE — ASSESSMENT & PLAN NOTE
Left hypothalamic cavernous malformation per MRI done at [facility] in 2021 that is stable. Patient will need to establish care with neurology locally.  Has appointment scheduled for 4/4/2024

## 2023-12-08 NOTE — TELEPHONE ENCOUNTER
----- Message from Rebecca Fay Kab-Perlman, MD sent at 12/8/2023  1:50 PM EST -----  Please call patient let him know we refilled his medications. We stopped the stelazine and increased the seroquel from 200mg AM and 300mg pm to 300mg BID. He must go to  pharmacy to  his medications. He needs a f/u appointent I sent a separate message regarding that.     Thanks

## 2023-12-08 NOTE — PROGRESS NOTES
Name: Karis Bonds      : 1970      MRN: [id number]  Encounter Provider: Kristy Bravo MD  Encounter Date: 2023   Encounter department: [address] [facility]     1. Encounter for medication review  Assessment & Plan:  Updated medications per medication reconciliation with pharmacy. Look to [facility] for details of med rec. Major concern include history of stroke and not on aspirin. As such will refill aspirin. Per messaging with psychiatrist the plan is as follows: Step 1) stop stelazine and increase seroquel IR to 300mg BID with f/u in two weeks to see if psychotic symptoms returning  Step 2) remove doxepin and can use hydroxyzine prn at night if needs help with sleep. Seroquel may help with sleep too. Doxepin has side undesirable side effects. Step 3) provided patient is stable, change seroquel IR from 300mg BID to 600mg ER at bedtime, can continue hydroxyzine TID prn for anxiety. Using seroquel as both sleep aid and antipsychotic. If psychotic symptoms return would increase seroquel by 100mg every two weeks up to 800mg. If symptoms still present at 800mg seroquel seek further consultation. At this time will not refilll depakote, clonidine, compazine, and furosemide. Unable to find record of seizures other than stated as a PMH problem. Has not been using any of these medications for several months or more. 2. Cavernous malformation  Assessment & Plan:  Left hypothalamic cavernous malformation per MRI done at [facility] in  that is stable. Patient will need to establish care with neurology locally. Has appointment scheduled for 2024      3. Other schizoaffective disorders ([address])  Comments:  Requested refills as he is out of meds. Advised to see pcp as soon as possible. Refilled meds  Assessment & Plan:  Please see encounter for medication review    Orders:  -     QUEtiapine (SEROquel) 300 mg tablet;  Take 1 tablet (300 mg total) by mouth 2 (two) times a day  -     hydrOXYzine HCL (ATARAX) 50 mg tablet; Take 1 tablet (50 mg total) by mouth 3 (three) times a day as needed for anxiety  -     escitalopram (LEXAPRO) 20 mg tablet; Take 1 tablet (20 mg total) by mouth daily  -     doxepin (SINEquan) 50 mg capsule; Take 1 capsule (50 mg total) by mouth in the morning    4. Primary hypertension  -     hydrochlorothiazide (MICROZIDE) 12.5 mg capsule; Take 1 capsule (12.5 mg total) by mouth in the morning    5. Cerebrovascular accident (CVA), unspecified mechanism ([address])  -     Aspirin Low Dose 81 MG EC tablet; Take 1 tablet (81 mg total) by mouth daily    6. Hyperlipidemia, unspecified hyperlipidemia type  -     atorvastatin (LIPITOR) 80 mg tablet; Take 1 tablet (80 mg total) by mouth daily        Subjective      Calderon Hale is a 45 yo male patient who presents today to perform a medication review. After the medication review will eliminate polypharmacy where appropriate and follow guidelines laid out by psychiatry. Patient brings in a list of his medications which is an after visit summary from and office vsiit on 10/2/2023. Appears to be grossly inaccurate. Called [facility] 3 for [facility] four times 15 minutes apart with no answer. Meanwhile updated what patient is supposedly taking per the list. Patient states that is the same list [facility] uses. I then performed a medication reconciliation with [facility] pharmacy because nothing made sense and I wanted to get to the bottom of it.      Called [facility] pharmacy  Lexapro 20mg 1 daily last filled 10/2/23 for 90 days  Atorvastatin 80mg daily last filled 10/5/23 for 90 days -has refills  Doxepin 50mg AM 10/2/23 90 days  Hydroxyzine hcl 50mg 1 tablet prn 10/2/23 90 tabs  Quetiapine 300mg bedtime 10/2/23 90 tabs  Quetiapine 200mg AM 10/2/23 90 tabs  HCTZ 12.5 AM 10/2/23 90 days  Stelazine 2 mg AM 10/2/23 90 tabs    Depakote ER 500mg daily 8/24/23 90 tabs   Aspirin 81mg last filled in  tabs   No clonidine last filled in  tabs   Depakote not filled since  tabs ran out     Last time furosemide 20 mg 1 tablet  received on    Colace last filled  100mg BID prn received 60. Has two more refills that will  in   Pharmacists does not see compazine   Tucks and preparation H not covered on plan  Picked up ketoconazole    I then called [facility] again but this time chose option 1 and spoke with the  of the central office. She will call the supervisor in [facility]. Requested that patient call clinic to set up 40 minute appointment sooner rather than later and that she request [facility] provides patient with the list of medications they are giving him. I then asked about what is done when a patient runs out of a medication and the reply was that people come and go and as this is the central office she is not sure. I also sent a staff message to have the patient return next week so we can discuss the changes. I then updated the medications and gave refills to match the plan as outlined in the A/P. We will progress through the steps accordingly. Review of Systems   Constitutional:  Negative for fever. Cardiovascular:  Negative for chest pain and palpitations. Psychiatric/Behavioral:  Negative for agitation, hallucinations, self-injury and suicidal ideas. The patient is not nervous/anxious.         Current Outpatient Medications on File Prior to Visit   Medication Sig    hydrocortisone (ANUSOL-HC) 2.5 % rectal cream Apply topically 2 (two) times a day    ketoconazole (NIZORAL) 2 % cream Apply topically daily    naloxone (NARCAN) 4 mg/0.1 mL nasal spray 0.1 mL (4 mg total) into each nostril every 3 (three) minutes as needed for opioid reversal or respiratory depression    [DISCONTINUED] atorvastatin (LIPITOR) 80 mg tablet Take 1 tablet (80 mg total) by mouth daily    [DISCONTINUED] cloNIDine (CATAPRES) 0.1 mg tablet Take 1 tablet (0.1 mg total) by mouth in the morning    [DISCONTINUED] dicyclomine (BENTYL) 10 mg/5 mL oral solution Take 20 mg by mouth 4 (four) times a day (before meals and at bedtime)    [DISCONTINUED] doxepin (SINEquan) 50 mg capsule Take 1 capsule (50 mg total) by mouth in the morning    [DISCONTINUED] escitalopram (LEXAPRO) 20 mg tablet Take 1 tablet (20 mg total) by mouth daily    [DISCONTINUED] hydrochlorothiazide (MICROZIDE) 12.5 mg capsule Take 1 capsule (12.5 mg total) by mouth in the morning    [DISCONTINUED] hydrOXYzine HCL (ATARAX) 50 mg tablet Take 1 tablet (50 mg total) by mouth 3 (three) times a day as needed for anxiety    [DISCONTINUED] ibuprofen (MOTRIN) 600 mg tablet Take by mouth every 6 (six) hours as needed    [DISCONTINUED] prochlorperazine (COMPAZINE) 5 mg tablet Take 5 mg by mouth every 6 (six) hours as needed    [DISCONTINUED] QUEtiapine (SEROquel) 200 mg tablet Take 1 tablet (200 mg total) by mouth in the morning    [DISCONTINUED] QUEtiapine (SEROquel) 300 mg tablet Take 1 tablet (300 mg total) by mouth daily at bedtime    furosemide (LASIX) 20 mg tablet Take 1 tablet (20 mg total) by mouth 2 (two) times a day    witch hazel-glycerin (TUCKS) topical pad Insert 1 Pad into the rectum as needed for irritation (Patient not taking: Reported on 12/8/2023)    [DISCONTINUED] acetaminophen (TYLENOL) 650 mg CR tablet Take 2 tablets (1,300 mg total) by mouth every 8 (eight) hours as needed for mild pain (Patient not taking: Reported on 12/8/2023)    [DISCONTINUED] Aspirin Low Dose 81 MG EC tablet Take 1 tablet (81 mg total) by mouth daily (Patient not taking: Reported on 12/8/2023)    [DISCONTINUED] benzonatate (TESSALON PERLES) 100 mg capsule Take 1 capsule (100 mg total) by mouth 3 (three) times a day as needed for cough (Patient not taking: Reported on 12/8/2023)    [DISCONTINUED] diclofenac sodium (VOLTAREN) 50 mg EC tablet Take 1 tablet (50 mg total) by mouth 2 (two) times a day [DISCONTINUED] divalproex sodium (DEPAKOTE ER) 500 mg 24 hr tablet Take 1 tablet (500 mg total) by mouth daily    [DISCONTINUED] docusate sodium (COLACE) 100 mg capsule Take 1 capsule (100 mg total) by mouth 2 (two) times a day as needed for constipation    [DISCONTINUED] pseudoephedrine-dextromethorphan-guaifenesin (ROBITUSSIN-PE) 30- MG/5ML solution Take 10 mL by mouth 4 (four) times a day as needed for allergies (Patient not taking: Reported on 12/8/2023)    [DISCONTINUED] trifluoperazine (STELAZINE) 2 mg tablet Take 1 tablet (2 mg total) by mouth in the morning (Patient not taking: Reported on 12/8/2023)       Objective     /84 (BP Location: Left arm, Patient Position: Sitting, Cuff Size: Standard)   Pulse 71   Temp 97.8 °F (36.6 °C) (Temporal)   Resp 17   Ht 5' 9" (1.753 m)   Wt 95.7 kg (211 lb)   SpO2 98%   BMI 31.16 kg/m²     Physical Exam  Constitutional:       Appearance: Normal appearance. HENT:      Head: Normocephalic and atraumatic. Right Ear: External ear normal.      Left Ear: External ear normal.      Nose: Nose normal.      Mouth/Throat:      Mouth: Mucous membranes are moist.      Pharynx: Oropharynx is clear. Eyes:      General: No scleral icterus. Right eye: No discharge. Left eye: No discharge. Extraocular Movements: Extraocular movements intact. Conjunctiva/sclera: Conjunctivae normal.   Cardiovascular:      Rate and Rhythm: Normal rate and regular rhythm. Pulses: Normal pulses. Heart sounds: Normal heart sounds. No murmur heard. No friction rub. No gallop. Pulmonary:      Effort: Pulmonary effort is normal.      Breath sounds: Normal breath sounds. No wheezing, rhonchi or rales. Musculoskeletal:         General: Normal range of motion. Cervical back: Normal range of motion. Comments: Walks with cane   Skin:     General: Skin is warm. Capillary Refill: Capillary refill takes less than 2 seconds.       Findings: No rash. Neurological:      General: No focal deficit present. Mental Status: He is alert and oriented to person, place, and time. Mental status is at baseline. Gait: Gait abnormal.   Psychiatric:         Mood and Affect: Mood normal.         Behavior: Behavior normal.         Thought Content:  Thought content normal.         Judgment: Judgment normal.       Lizzie Estrada MD

## 2023-12-08 NOTE — ASSESSMENT & PLAN NOTE
Updated medications per medication reconciliation with pharmacy. Look to \[facility]\"" for details of med rec. Major concern include history of stroke and not on aspirin. As such will refill aspirin. Per messaging with psychiatrist the plan is as follows: Step 1) stop stelazine and increase seroquel IR to 300mg BID with f/u in two weeks to see if psychotic symptoms returning  Step 2) remove doxepin and can use hydroxyzine prn at night if needs help with sleep. Seroquel may help with sleep too. Doxepin has side undesirable side effects. Step 3) provided patient is stable, change seroquel IR from 300mg BID to 600mg ER at bedtime, can continue hydroxyzine TID prn for anxiety. Using seroquel as both sleep aid and antipsychotic. If psychotic symptoms return would increase seroquel by 100mg every two weeks up to 800mg. If symptoms still present at 800mg seroquel seek further consultation. At this time will not refilll depakote, clonidine, compazine, and furosemide. Unable to find record of seizures other than stated as a PMH problem. Has not been using any of these medications for several months or more.

## 2023-12-11 ENCOUNTER — TELEPHONE (OUTPATIENT)
Dept: FAMILY MEDICINE CLINIC | Facility: CLINIC | Age: 53
End: 2023-12-11

## 2023-12-11 NOTE — TELEPHONE ENCOUNTER
----- Message from Gail Lino MD sent at 12/8/2023 10:57 AM EST -----  Regarding: appointment  Please have patient set up an appointment next week. Let him know his medications have been adjusted and then we need to discuss the plan going forward. Let patient know that I have reviewed his medications with the help of both a psychiatrist and a behaviouralist. We will simplify his regimen and remove unnecessary medications.     Thanks

## 2024-03-12 ENCOUNTER — TELEPHONE (OUTPATIENT)
Dept: NEUROLOGY | Facility: CLINIC | Age: 54
End: 2024-03-12

## 2024-03-12 NOTE — TELEPHONE ENCOUNTER
Called pt and spoke with him letting him know for his upcoming appointment on 4/4/24 @2:45 with Geronimo he has some still to still be completed.

## 2024-03-21 ENCOUNTER — TELEPHONE (OUTPATIENT)
Dept: FAMILY MEDICINE CLINIC | Facility: CLINIC | Age: 54
End: 2024-03-21

## 2024-03-21 NOTE — TELEPHONE ENCOUNTER
Pt call the office today to let you know that he was moved to another [facility] facility in [facility]. Pt wants you to talk to the director to explain to him that he should be back her in [place] due to having all his medical records here. Pt states he does not want to be be in [place], director name is Jas and phone # [phone]. thanks

## 2024-04-11 ENCOUNTER — OFFICE VISIT (OUTPATIENT)
Dept: FAMILY MEDICINE CLINIC | Facility: CLINIC | Age: 54
End: 2024-04-11

## 2024-04-11 VITALS
BODY MASS INDEX: 30.33 KG/M2 | OXYGEN SATURATION: 98 % | SYSTOLIC BLOOD PRESSURE: 117 MMHG | HEART RATE: 83 BPM | TEMPERATURE: 97.6 F | WEIGHT: 205.4 LBS | DIASTOLIC BLOOD PRESSURE: 74 MMHG

## 2024-04-11 DIAGNOSIS — Z86.73 HISTORY OF STROKE: ICD-10-CM

## 2024-04-11 DIAGNOSIS — E78.5 HYPERLIPIDEMIA, UNSPECIFIED HYPERLIPIDEMIA TYPE: ICD-10-CM

## 2024-04-11 DIAGNOSIS — K64.2 GRADE III HEMORRHOIDS: ICD-10-CM

## 2024-04-11 DIAGNOSIS — F25.8 OTHER SCHIZOAFFECTIVE DISORDERS (HCC): Primary | ICD-10-CM

## 2024-04-11 DIAGNOSIS — I10 PRIMARY HYPERTENSION: ICD-10-CM

## 2024-04-11 PROCEDURE — 99213 OFFICE O/P EST LOW 20 MIN: CPT | Performed by: FAMILY MEDICINE

## 2024-04-11 RX ORDER — QUETIAPINE FUMARATE 300 MG/1
300 TABLET, FILM COATED ORAL 2 TIMES DAILY
Qty: 180 TABLET | Refills: 0 | Status: SHIPPED | OUTPATIENT
Start: 2024-04-11 | End: 2024-07-10

## 2024-04-11 RX ORDER — HYDROCORTISONE 25 MG/G
CREAM TOPICAL 2 TIMES DAILY
Qty: 28 G | Refills: 0 | Status: SHIPPED | OUTPATIENT
Start: 2024-04-11

## 2024-04-11 RX ORDER — ATORVASTATIN CALCIUM 80 MG/1
80 TABLET, FILM COATED ORAL DAILY
Qty: 90 TABLET | Refills: 1 | Status: SHIPPED | OUTPATIENT
Start: 2024-04-11

## 2024-04-11 NOTE — PROGRESS NOTES
Name: Calderon Alvarado      : 1970      MRN: [id number]  Encounter Provider: Noni Hanna MD  Encounter Date: 2024   Encounter department: [facility]    Assessment & Plan     1. Other schizoaffective disorders (HCC)  Comments:  Requested refills as he is out of meds. Advised to see pcp as soon as possible. Refilled meds  Assessment & Plan:  Patient was previously on Seroquel 300mg BID, there was a plan to increase it to 600mg at night by PCP if he was stable in previous dose given that it can help with his insomnia, however patient would like to continue on the seroquel 300mg BID    Orders:  -     QUEtiapine (SEROquel) 300 mg tablet; Take 1 tablet (300 mg total) by mouth 2 (two) times a day    2. Hyperlipidemia, unspecified hyperlipidemia type  -     atorvastatin (LIPITOR) 80 mg tablet; Take 1 tablet (80 mg total) by mouth daily    3. Grade III hemorrhoids  -     hydrocortisone (ANUSOL-HC) 2.5 % rectal cream; Apply topically 2 (two) times a day    4. History of stroke  Assessment & Plan:  Follows with neurology   Currently doing well   Continue with lipitor 80mg       5. Primary hypertension  Assessment & Plan:  BP today of 117.74, not any medication for it, continue with lifestyle modification              Subjective    [id number] [employer]  used     HPI  This is a very pleasant 53 y.o. male who presents to the clinic for management of their chronic medical conditions.  Patient's medical conditions are stable unless noted otherwise above.  Patient has not had any recent hospitalizations, or medical emergencies since last visit.  Patient has no further complaints other than what is mentioned in the ROS.    Review of Systems   Constitutional:  Negative for appetite change and fatigue.   Respiratory:  Negative for chest tightness and shortness of breath.    Psychiatric/Behavioral:  Negative for behavioral problems and hallucinations.         Current Outpatient Medications on File Prior to Visit   Medication Sig    Aspirin Low Dose 81 MG EC tablet Take 1 tablet (81 mg total) by mouth daily    escitalopram (LEXAPRO) 20 mg tablet Take 1 tablet (20 mg total) by mouth daily    furosemide (LASIX) 20 mg tablet Take 1 tablet (20 mg total) by mouth 2 (two) times a day    hydrochlorothiazide (MICROZIDE) 12.5 mg capsule Take 1 capsule (12.5 mg total) by mouth in the morning    ketoconazole (NIZORAL) 2 % cream Apply topically daily    naloxone (NARCAN) 4 mg/0.1 mL nasal spray 0.1 mL (4 mg total) into each nostril every 3 (three) minutes as needed for opioid reversal or respiratory depression    witch hazel-glycerin (TUCKS) topical pad Insert 1 Pad into the rectum as needed for irritation (Patient not taking: Reported on 12/8/2023)    [DISCONTINUED] atorvastatin (LIPITOR) 80 mg tablet Take 1 tablet (80 mg total) by mouth daily    [DISCONTINUED] doxepin (SINEquan) 50 mg capsule Take 1 capsule (50 mg total) by mouth in the morning    [DISCONTINUED] hydrocortisone (ANUSOL-HC) 2.5 % rectal cream Apply topically 2 (two) times a day    [DISCONTINUED] hydrOXYzine HCL (ATARAX) 50 mg tablet Take 1 tablet (50 mg total) by mouth 3 (three) times a day as needed for anxiety    [DISCONTINUED] QUEtiapine (SEROquel) 300 mg tablet Take 1 tablet (300 mg total) by mouth 2 (two) times a day       Objective     /74 (BP Location: Left arm, Patient Position: Sitting, Cuff Size: Standard)   Pulse 83   Temp 97.6 °F (36.4 °C) (Temporal)   Wt 93.2 kg (205 lb 6.4 oz)   SpO2 98%   BMI 30.33 kg/m²     Physical Exam  Cardiovascular:      Rate and Rhythm: Normal rate.   Pulmonary:      Effort: Pulmonary effort is normal.   Neurological:      Mental Status: He is alert and oriented to person, place, and time.       Noni Hanna MD

## 2024-04-11 NOTE — ASSESSMENT & PLAN NOTE
Patient was previously on Seroquel 300mg BID, there was a plan to increase it to 600mg at night by PCP if he was stable in previous dose given that it can help with his insomnia, however patient would like to continue on the seroquel 300mg BID

## 2024-05-30 DIAGNOSIS — F25.8 OTHER SCHIZOAFFECTIVE DISORDERS (HCC): ICD-10-CM

## 2024-05-30 DIAGNOSIS — E78.5 HYPERLIPIDEMIA, UNSPECIFIED HYPERLIPIDEMIA TYPE: ICD-10-CM

## 2024-05-30 DIAGNOSIS — I63.9 CEREBROVASCULAR ACCIDENT (CVA), UNSPECIFIED MECHANISM (HCC): ICD-10-CM

## 2024-05-30 NOTE — TELEPHONE ENCOUNTER
Pt came into office and is requesting refill for the following medications     QUEtiapine (SEROquel) 300 mg tablet   atorvastatin (LIPITOR) 80 mg tablet     Aspirin Low Dose 81 MG EC tablet   escitalopram (LEXAPRO) 20 mg tablet     Pt has f/u appt scheduled for 6/20/24     Any questions please contact pt, thank you !

## 2024-06-05 RX ORDER — ASPIRIN 81 MG/1
81 TABLET, COATED ORAL DAILY
Qty: 90 TABLET | Refills: 0 | Status: SHIPPED | OUTPATIENT
Start: 2024-06-05 | End: 2024-09-03

## 2024-06-05 RX ORDER — QUETIAPINE FUMARATE 300 MG/1
300 TABLET, FILM COATED ORAL 2 TIMES DAILY
Qty: 180 TABLET | Refills: 0 | Status: SHIPPED | OUTPATIENT
Start: 2024-06-05 | End: 2024-09-03

## 2024-06-05 RX ORDER — ESCITALOPRAM OXALATE 20 MG/1
20 TABLET ORAL DAILY
Qty: 90 TABLET | Refills: 0 | Status: SHIPPED | OUTPATIENT
Start: 2024-06-05 | End: 2024-09-03

## 2024-06-05 RX ORDER — ATORVASTATIN CALCIUM 80 MG/1
80 TABLET, FILM COATED ORAL DAILY
Qty: 90 TABLET | Refills: 1 | Status: SHIPPED | OUTPATIENT
Start: 2024-06-05

## 2024-06-12 ENCOUNTER — TELEPHONE (OUTPATIENT)
Dept: FAMILY MEDICINE CLINIC | Facility: CLINIC | Age: 54
End: 2024-06-12

## 2024-06-12 NOTE — TELEPHONE ENCOUNTER
PCP SIGNATURE NEEDED FOR [facility] [employer] plans FORM RECEIVED VIA FAX AND PLACED IN PCP FOLDER TO BE DELIVERED AT ASSIGNED TIMES.      [employer]

## 2024-06-14 ENCOUNTER — HOSPITAL ENCOUNTER (EMERGENCY)
Facility: HOSPITAL | Age: 54
Discharge: HOME | End: 2024-06-14
Attending: EMERGENCY MEDICINE
Payer: COMMERCIAL

## 2024-06-14 VITALS
SYSTOLIC BLOOD PRESSURE: 134 MMHG | WEIGHT: 213.4 LBS | TEMPERATURE: 98.6 F | DIASTOLIC BLOOD PRESSURE: 92 MMHG | BODY MASS INDEX: 31.51 KG/M2 | OXYGEN SATURATION: 98 % | HEART RATE: 80 BPM | RESPIRATION RATE: 12 BRPM

## 2024-06-14 DIAGNOSIS — R04.0 RIGHT-SIDED EPISTAXIS: Primary | ICD-10-CM

## 2024-06-14 PROCEDURE — 99282 EMERGENCY DEPT VISIT SF MDM: CPT

## 2024-06-14 PROCEDURE — 99283 EMERGENCY DEPT VISIT LOW MDM: CPT | Performed by: PHYSICIAN ASSISTANT

## 2024-06-14 NOTE — ED PROVIDER NOTES
History  Chief Complaint   Patient presents with    Nose Bleed     20 min PTA, was sitting and felt it bleed, (stated if collected it would only fill a teaspoon). Concerned because he has a stroke history. At this time denies N/V/D, dizziness, headache.  Has been clean from drugs 2 yrs since he is in [place].       54-year-old male past medical history significant for stroke without residual neurologic deficits for which he does follow with family care provider and neurology presenting for evaluation of a nosebleed which terminated prior to arrival.  Patient reports that for approximately 20 minutes he had a nosebleed from his right nostril which was not brought on by trauma, digital irritation or other inciting factors to his knowledge.  Patient reports he has been feeling as though his nose has been dry over the past few days however denies any headaches, lightheadedness, dizziness and reports the amount of blood he lost was approximately 1 to 2 teaspoons.  He reports simply due to his history of stroke he presented as he was concerned for the nosebleed.  He reports he does not use any nasal saline or nasal sprays.      Nose Bleed  Associated symptoms: no congestion, no dizziness, no fever and no sore throat        Prior to Admission Medications   Prescriptions Last Dose Informant Patient Reported? Taking?   Aspirin Low Dose 81 MG EC tablet   No No   Sig: Take 1 tablet (81 mg total) by mouth daily   QUEtiapine (SEROquel) 300 mg tablet   No No   Sig: Take 1 tablet (300 mg total) by mouth 2 (two) times a day   atorvastatin (LIPITOR) 80 mg tablet   No No   Sig: Take 1 tablet (80 mg total) by mouth daily   escitalopram (LEXAPRO) 20 mg tablet   No No   Sig: Take 1 tablet (20 mg total) by mouth daily   furosemide (LASIX) 20 mg tablet   No No   Sig: Take 1 tablet (20 mg total) by mouth 2 (two) times a day   hydrochlorothiazide (MICROZIDE) 12.5 mg capsule   No No   Sig: Take 1 capsule (12.5 mg total) by mouth in the  morning   hydrocortisone (ANUSOL-HC) 2.5 % rectal cream   No No   Sig: Apply topically 2 (two) times a day   ketoconazole (NIZORAL) 2 % cream   No No   Sig: Apply topically daily   naloxone (NARCAN) 4 mg/0.1 mL nasal spray   No No   Si.1 mL (4 mg total) into each nostril every 3 (three) minutes as needed for opioid reversal or respiratory depression   witch hazel-glycerin (TUCKS) topical pad   No No   Sig: Insert 1 Pad into the rectum as needed for irritation   Patient not taking: Reported on 2023      Facility-Administered Medications: None       Past Medical History:   Diagnosis Date    Cavernous angioma 2022    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 Added automatically from request for surgery [id number]    Hep C w/o coma, chronic ([facility]) 10/4/2018    Nocturnal headaches 2022    Opiate dependence ([facility]) 2021    Formatting of this note might be different from the original. Started suboxone    Other schizoaffective disorders ([facility]) 2023    Right sided weakness 2021    Spasm 2022    Stroke ([facility])        History reviewed. No pertinent surgical history.    History reviewed. No pertinent family history.  I have reviewed and agree with the history as documented.    E-Cigarette/Vaping    E-Cigarette Use Never User      E-Cigarette/Vaping Substances    Nicotine No     THC No     CBD No     Flavoring No     Other No     Unknown No      Social History     Tobacco Use    Smoking status: Every Day     Current packs/day: 1.00     Types: Cigarettes     Passive exposure: Never    Smokeless tobacco: Never   Vaping Use    Vaping status: Never Used   Substance Use Topics    Alcohol use: Not Currently    Drug use: Not Currently       Review of Systems   Constitutional:  Negative for chills, fatigue and fever.   HENT:  Positive for nosebleeds. Negative for congestion, ear  pain, rhinorrhea and sore throat.    Eyes:  Negative for redness.   Respiratory:  Negative for chest tightness and shortness of breath.    Cardiovascular:  Negative for chest pain and palpitations.   Gastrointestinal:  Negative for abdominal pain, nausea and vomiting.   Genitourinary:  Negative for dysuria and hematuria.   Musculoskeletal: Negative.    Skin:  Negative for rash.   Neurological:  Negative for dizziness, syncope, light-headedness and numbness.       Physical Exam  Physical Exam  Vitals and nursing note reviewed.   Constitutional:       Appearance: Normal appearance. He is well-developed.   HENT:      Head: Normocephalic and atraumatic.      Nose:      Comments: Bilateral external nares are clear without dried blood.  Right-sided nare has some dried blood to the inside of the nostril, no active bleeding is identified.      Mouth/Throat:      Comments: Clear oropharynx and posterior oropharynx, no sublingual or submandibular swelling or hypertrophy appreciated.  Patient with a midline uvula.  No tonsillar swelling or exudate appreciated.  Patient is managing oral secretions without difficulty.  No phonation changes.    Eyes:      General: No scleral icterus.     Pupils: Pupils are equal, round, and reactive to light.   Cardiovascular:      Rate and Rhythm: Normal rate and regular rhythm.      Pulses: Normal pulses.   Pulmonary:      Effort: Pulmonary effort is normal. No respiratory distress.      Breath sounds: No stridor.   Abdominal:      General: There is no distension.      Palpations: There is no mass.   Musculoskeletal:      Cervical back: Normal range of motion.   Skin:     General: Skin is warm and dry.      Capillary Refill: Capillary refill takes less than 2 seconds.      Coloration: Skin is not jaundiced.   Neurological:      Mental Status: He is alert and oriented to person, place, and time.      Gait: Gait normal.   Psychiatric:         Mood and Affect: Mood normal.         Vital Signs  ED  Triage Vitals [06/14/24 1126]   Temperature Pulse Respirations Blood Pressure SpO2   98.6 °F (37 °C) 80 12 134/92 98 %      Temp Source Heart Rate Source Patient Position - Orthostatic VS BP Location FiO2 (%)   Oral Monitor Sitting Left arm --      Pain Score       --           Vitals:    06/14/24 1126   BP: 134/92   Pulse: 80   Patient Position - Orthostatic VS: Sitting         Visual Acuity      ED Medications  Medications - No data to display    Diagnostic Studies  Results Reviewed       None                   No orders to display              Procedures  Procedures         ED Course                               SBIRT 22yo+      Flowsheet Row Most Recent Value   Initial Alcohol Screen: US AUDIT-C     1. How often do you have a drink containing alcohol? 0 Filed at: 06/14/2024 1133   2. How many drinks containing alcohol do you have on a typical day you are drinking?  0 Filed at: 06/14/2024 1133   3a. Male UNDER 65: How often do you have five or more drinks on one occasion? 0 Filed at: 06/14/2024 1133   3b. FEMALE Any Age, or MALE 65+: How often do you have 4 or more drinks on one occassion? 0 Filed at: 06/14/2024 1133   Audit-C Score 0 Filed at: 06/14/2024 1133   ALEXA: How many times in the past year have you...    Used an illegal drug or used a prescription medication for non-medical reasons? Never Filed at: 06/14/2024 1133                      Medical Decision Making  54-year-old male presenting for evaluation of a nosebleed which ceased prior to arrival in the ER.  Patient reports a less than 20-minute episode of right-sided epistaxis with reportedly less than 1 cup of blood loss patient specifically states approximately 1 to 2 teaspoons of blood loss and denies any dizziness lightheadedness chest pain palpitations or other evidence of acute anemia patient's vital signs are stable within normal limits no evidence to indicate workup at this time or imaging patient educated on keeping the nares moisturized and  "was prescribed nasal saline and advised he can use Vaseline as needed to assist with this.  Patient advised to follow-up with family care provider.  No other complaints .    Strict return to ED precautions discussed. Patient and/or family members verbalizes understanding and agrees with plan. Patient is stable for discharge     Portions of the record may have been created with voice recognition software. Occasional wrong word or \"sound a like\" substitutions may have occurred due to the inherent limitations of voice recognition software. Read the chart carefully and recognize, using context, where substitutions have occurred.    Risk  OTC drugs.             Disposition  Final diagnoses:   Right-sided epistaxis     Time reflects when diagnosis was documented in both MDM as applicable and the Disposition within this note       Time User Action Codes Description Comment    6/14/2024 11:37 AM Brandi Pemberton Add [R04.0] Right-sided epistaxis           ED Disposition       ED Disposition   Discharge    Condition   Good    Date/Time   Fri Jun 14, 2024 1137    Comment   Calderon Alvarado discharge to home/self care.                   Follow-up Information       Follow up With Specialties Details Why Contact Info    Ryan Rodarte MD Family Medicine  As needed [address] [facility] [address]  [phone]              Patient's Medications   Discharge Prescriptions    SODIUM CHLORIDE (OCEAN) 0.65 % NASAL SPRAY    1 spray into each nostril as needed for congestion (as needed for congestion)       Start Date: 6/14/2024 End Date: 6/14/2025       Order Dose: 1 spray       Quantity: 15 mL    Refills: 0       No discharge procedures on file.    PDMP Review       None            ED Provider  Electronically Signed by             Brandi Pemberton PA-C  06/14/24 1144    "

## 2024-06-20 ENCOUNTER — OFFICE VISIT (OUTPATIENT)
Dept: FAMILY MEDICINE CLINIC | Facility: CLINIC | Age: 54
End: 2024-06-20

## 2024-06-20 VITALS
WEIGHT: 213.8 LBS | BODY MASS INDEX: 31.67 KG/M2 | HEART RATE: 69 BPM | SYSTOLIC BLOOD PRESSURE: 129 MMHG | TEMPERATURE: 97.5 F | OXYGEN SATURATION: 98 % | RESPIRATION RATE: 16 BRPM | HEIGHT: 69 IN | DIASTOLIC BLOOD PRESSURE: 77 MMHG

## 2024-06-20 DIAGNOSIS — F25.8 OTHER SCHIZOAFFECTIVE DISORDERS (HCC): ICD-10-CM

## 2024-06-20 DIAGNOSIS — B35.3 TINEA PEDIS OF BOTH FEET: ICD-10-CM

## 2024-06-20 DIAGNOSIS — I10 PRIMARY HYPERTENSION: Primary | ICD-10-CM

## 2024-06-20 DIAGNOSIS — K64.2 GRADE III HEMORRHOIDS: ICD-10-CM

## 2024-06-20 DIAGNOSIS — R05.2 SUBACUTE COUGH: ICD-10-CM

## 2024-06-20 PROBLEM — R42 DIZZINESS: Status: RESOLVED | Noted: 2023-06-06 | Resolved: 2024-06-20

## 2024-06-20 PROBLEM — J06.9 UPPER RESPIRATORY TRACT INFECTION: Status: RESOLVED | Noted: 2023-10-16 | Resolved: 2024-06-20

## 2024-06-20 PROCEDURE — 99213 OFFICE O/P EST LOW 20 MIN: CPT | Performed by: FAMILY MEDICINE

## 2024-06-20 RX ORDER — HYDROCORTISONE 25 MG/G
CREAM TOPICAL 2 TIMES DAILY
Qty: 28 G | Refills: 1 | Status: SHIPPED | OUTPATIENT
Start: 2024-06-20

## 2024-06-20 RX ORDER — KETOCONAZOLE 20 MG/G
CREAM TOPICAL DAILY
Qty: 60 G | Refills: 1 | Status: SHIPPED | OUTPATIENT
Start: 2024-06-20

## 2024-06-20 NOTE — PROGRESS NOTES
"Ambulatory Visit  Name: Calderon Alvarado      : 1970      MRN: [id number]  Encounter Provider: Noni Hanna MD  Encounter Date: 2024   Encounter department: [facility]    Assessment & Plan   1. Primary hypertension  Assessment & Plan:  BP today of 117.74, not any medication for it, continue with lifestyle modification      2. Grade III hemorrhoids  -     hydrocortisone (ANUSOL-HC) 2.5 % rectal cream; Apply topically 2 (two) times a day  3. Tinea pedis of both feet  Assessment & Plan:  On ketoconazole cream, report improvement, will continue   Orders:  -     ketoconazole (NIZORAL) 2 % cream; Apply topically daily  4. Other schizoaffective disorders (HCC)  Assessment & Plan:  On seroquel 300mg bid, will continue    5. Subacute cough  Comments:  3 weeks duration, associated with clear phlem, no fever or chills or system signs of illness   trial of mucinex  Orders:  -     dextromethorphan-guaifenesin (MUCINEX DM)  MG per 12 hr tablet; Take 1 tablet by mouth every 12 (twelve) hours       History of Present Illness     HPI  [employer]  [id number] used   Calderon Alvarado is a 54 y.o. male who present to the office for medication refills. He reports he thought he ran out of his medicaiton  Upon chart review, it is noted that almost all of his medications were sent to his pharmacy by his [facility] on 2024. He is made aware of this, and advice to go pick them up     Review of Systems   Constitutional:  Negative for chills and fever.   Respiratory:  Positive for cough. Negative for choking, chest tightness, shortness of breath, wheezing and stridor.    Cardiovascular:  Negative for chest pain and leg swelling.       Objective     /77 (BP Location: Left arm, Patient Position: Sitting, Cuff Size: Standard)   Pulse 69   Temp 97.5 °F (36.4 °C) (Temporal)   Resp 16   Ht 5' 9\" (1.753 m)   Wt 97 kg (213 lb 12.8 oz)   SpO2 98%   BMI 31.57 " kg/m²     Physical Exam  Cardiovascular:      Rate and Rhythm: Normal rate.   Pulmonary:      Effort: Pulmonary effort is normal. No respiratory distress.      Breath sounds: Normal breath sounds. No stridor. No wheezing, rhonchi or rales.   Chest:      Chest wall: No tenderness.   Neurological:      Mental Status: He is alert and oriented to person, place, and time.       Administrative Statements

## 2024-07-16 ENCOUNTER — APPOINTMENT (OUTPATIENT)
Dept: LAB | Facility: HOSPITAL | Age: 54
End: 2024-07-16
Payer: COMMERCIAL

## 2024-07-16 DIAGNOSIS — J06.9 UPPER RESPIRATORY TRACT INFECTION, UNSPECIFIED TYPE: ICD-10-CM

## 2024-07-16 DIAGNOSIS — E78.5 HYPERLIPIDEMIA, UNSPECIFIED HYPERLIPIDEMIA TYPE: ICD-10-CM

## 2024-07-16 LAB
ALBUMIN SERPL BCG-MCNC: 4.7 G/DL (ref 3.5–5)
ALP SERPL-CCNC: 56 U/L (ref 34–104)
ALT SERPL W P-5'-P-CCNC: 11 U/L (ref 7–52)
ANION GAP SERPL CALCULATED.3IONS-SCNC: 8 MMOL/L (ref 4–13)
AST SERPL W P-5'-P-CCNC: 18 U/L (ref 13–39)
BILIRUB SERPL-MCNC: 0.7 MG/DL (ref 0.2–1)
BUN SERPL-MCNC: 12 MG/DL (ref 5–25)
CALCIUM SERPL-MCNC: 9.4 MG/DL (ref 8.4–10.2)
CHLORIDE SERPL-SCNC: 103 MMOL/L (ref 96–108)
CHOLEST SERPL-MCNC: 199 MG/DL
CO2 SERPL-SCNC: 28 MMOL/L (ref 21–32)
CREAT SERPL-MCNC: 1.09 MG/DL (ref 0.6–1.3)
GFR SERPL CREATININE-BSD FRML MDRD: 76 ML/MIN/1.73SQ M
GLUCOSE P FAST SERPL-MCNC: 99 MG/DL (ref 65–99)
HDLC SERPL-MCNC: 27 MG/DL
LDLC SERPL CALC-MCNC: 99 MG/DL (ref 0–100)
POTASSIUM SERPL-SCNC: 4 MMOL/L (ref 3.5–5.3)
PROT SERPL-MCNC: 7.7 G/DL (ref 6.4–8.4)
SODIUM SERPL-SCNC: 139 MMOL/L (ref 135–147)
TRIGL SERPL-MCNC: 366 MG/DL

## 2024-07-16 PROCEDURE — 36415 COLL VENOUS BLD VENIPUNCTURE: CPT

## 2024-07-16 PROCEDURE — 80053 COMPREHEN METABOLIC PANEL: CPT

## 2024-07-16 PROCEDURE — 80061 LIPID PANEL: CPT

## 2024-07-17 ENCOUNTER — HOSPITAL ENCOUNTER (EMERGENCY)
Facility: HOSPITAL | Age: 54
Discharge: HOME | End: 2024-07-17
Payer: COMMERCIAL

## 2024-07-17 VITALS
RESPIRATION RATE: 17 BRPM | TEMPERATURE: 98.1 F | HEART RATE: 80 BPM | OXYGEN SATURATION: 95 % | SYSTOLIC BLOOD PRESSURE: 122 MMHG | DIASTOLIC BLOOD PRESSURE: 84 MMHG

## 2024-07-17 DIAGNOSIS — K08.89 DENTALGIA: ICD-10-CM

## 2024-07-17 DIAGNOSIS — K04.7 DENTAL INFECTION: Primary | ICD-10-CM

## 2024-07-17 PROCEDURE — 99284 EMERGENCY DEPT VISIT MOD MDM: CPT

## 2024-07-17 PROCEDURE — 99282 EMERGENCY DEPT VISIT SF MDM: CPT

## 2024-07-17 RX ORDER — AMOXICILLIN AND CLAVULANATE POTASSIUM 875; 125 MG/1; MG/1
1 TABLET, FILM COATED ORAL EVERY 12 HOURS SCHEDULED
Qty: 14 TABLET | Refills: 0 | Status: SHIPPED | OUTPATIENT
Start: 2024-07-17 | End: 2024-07-24

## 2024-07-17 RX ORDER — CHLORHEXIDINE GLUCONATE ORAL RINSE 1.2 MG/ML
15 SOLUTION DENTAL 2 TIMES DAILY
Qty: 120 ML | Refills: 0 | Status: SHIPPED | OUTPATIENT
Start: 2024-07-17

## 2024-07-17 RX ORDER — SENNOSIDES 8.6 MG
650 CAPSULE ORAL EVERY 8 HOURS PRN
Qty: 30 TABLET | Refills: 0 | Status: SHIPPED | OUTPATIENT
Start: 2024-07-17

## 2024-07-17 NOTE — ED PROVIDER NOTES
History  Chief Complaint   Patient presents with    Dental Pain     x2d, L upper side. No meds. No dentist     54-year-old male presenting to emergency department for dental pain for 2 days.      History provided by:  Patient   [facility]: [facility])    Dental Pain  Location:  Upper  Upper teeth location:  15/CORI 2nd molar  Duration:  2 days  Context: not recent dental surgery and not trauma    Relieved by:  None tried  Associated symptoms: gum swelling    Associated symptoms: no congestion, no difficulty swallowing, no drooling, no facial pain, no facial swelling, no fever, no headaches, no neck pain, no neck swelling, no oral bleeding, no oral lesions and no trismus    Risk factors: no cancer, no diabetes and no immunosuppression        Prior to Admission Medications   Prescriptions Last Dose Informant Patient Reported? Taking?   Aspirin Low Dose 81 MG EC tablet   No No   Sig: Take 1 tablet (81 mg total) by mouth daily   QUEtiapine (SEROquel) 300 mg tablet   No No   Sig: Take 1 tablet (300 mg total) by mouth 2 (two) times a day   atorvastatin (LIPITOR) 80 mg tablet   No No   Sig: Take 1 tablet (80 mg total) by mouth daily   dextromethorphan-guaifenesin (MUCINEX DM)  MG per 12 hr tablet   No No   Sig: Take 1 tablet by mouth every 12 (twelve) hours   escitalopram (LEXAPRO) 20 mg tablet   No No   Sig: Take 1 tablet (20 mg total) by mouth daily   furosemide (LASIX) 20 mg tablet   No No   Sig: Take 1 tablet (20 mg total) by mouth 2 (two) times a day   hydrochlorothiazide (MICROZIDE) 12.5 mg capsule   No No   Sig: Take 1 capsule (12.5 mg total) by mouth in the morning   hydrocortisone (ANUSOL-HC) 2.5 % rectal cream   No No   Sig: Apply topically 2 (two) times a day   ketoconazole (NIZORAL) 2 % cream   No No   Sig: Apply topically daily   naloxone (NARCAN) 4 mg/0.1 mL nasal spray   No No   Si.1 mL (4 mg total) into each nostril every 3 (three) minutes as needed for opioid reversal or respiratory  depression   sodium chloride (OCEAN) 0.65 % nasal spray   No No   Si spray into each nostril as needed for congestion (as needed for congestion)   witch hazel-glycerin (TUCKS) topical pad   No No   Sig: Insert 1 Pad into the rectum as needed for irritation   Patient not taking: Reported on 2023      Facility-Administered Medications: None       Past Medical History:   Diagnosis Date    Cavernous angioma 2022    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 Added automatically from request for surgery [id number]    Hep C w/o coma, chronic ([facility]) 10/4/2018    Nocturnal headaches 2022    Opiate dependence ([facility]) 2021    Formatting of this note might be different from the original. Started suboxone    Other schizoaffective disorders ([facility]) 2023    Right sided weakness 2021    Spasm 2022    Stroke ([facility])        History reviewed. No pertinent surgical history.    History reviewed. No pertinent family history.  I have reviewed and agree with the history as documented.    E-Cigarette/Vaping    E-Cigarette Use Never User      E-Cigarette/Vaping Substances    Nicotine No     THC No     CBD No     Flavoring No     Other No     Unknown No      Social History     Tobacco Use    Smoking status: Every Day     Current packs/day: 1.00     Types: Cigarettes     Passive exposure: Never    Smokeless tobacco: Never   Vaping Use    Vaping status: Never Used   Substance Use Topics    Alcohol use: Not Currently    Drug use: Not Currently       Review of Systems   Constitutional:  Negative for chills and fever.   HENT:  Positive for dental problem. Negative for congestion, drooling, ear pain, facial swelling, mouth sores, sore throat, trouble swallowing and voice change.    Eyes:  Negative for pain and visual disturbance.   Respiratory:  Negative for choking and shortness of breath.     Cardiovascular:  Negative for chest pain.   Gastrointestinal:  Negative for nausea and vomiting.   Musculoskeletal:  Negative for neck pain.   Skin:  Negative for color change and rash.   Neurological:  Negative for dizziness, facial asymmetry, weakness, numbness and headaches.   All other systems reviewed and are negative.      Physical Exam  Physical Exam  Vitals and nursing note reviewed.   Constitutional:       General: He is not in acute distress.     Appearance: Normal appearance. He is well-developed. He is not ill-appearing, toxic-appearing or diaphoretic.   HENT:      Head: Normocephalic and atraumatic.      Jaw: There is normal jaw occlusion. No trismus, tenderness, swelling or malocclusion.      Right Ear: Tympanic membrane, ear canal and external ear normal.      Left Ear: Tympanic membrane, ear canal and external ear normal.      Nose: Nose normal.      Mouth/Throat:      Lips: Pink.      Mouth: Mucous membranes are moist. No angioedema.      Dentition: Abnormal dentition. Dental tenderness, gingival swelling and dental caries present. No dental abscesses or gum lesions.      Pharynx: Oropharynx is clear. Uvula midline. No pharyngeal swelling, oropharyngeal exudate, posterior oropharyngeal erythema or uvula swelling.      Tonsils: No tonsillar exudate or tonsillar abscesses.     Eyes:      General:         Right eye: No discharge.         Left eye: No discharge.      Extraocular Movements: Extraocular movements intact.      Conjunctiva/sclera: Conjunctivae normal.      Pupils: Pupils are equal, round, and reactive to light.   Neck:      Trachea: Phonation normal.      Comments: Patient maintaining airway and secretions. No stridor . No brawniness under tongue.       Cardiovascular:      Rate and Rhythm: Normal rate and regular rhythm.   Pulmonary:      Effort: Pulmonary effort is normal. No respiratory distress.      Breath sounds: No stridor.   Musculoskeletal:      Cervical back: Full passive range  of motion without pain.   Lymphadenopathy:      Cervical: No cervical adenopathy.   Skin:     General: Skin is warm and dry.      Capillary Refill: Capillary refill takes less than 2 seconds.      Findings: No erythema or rash.   Neurological:      Mental Status: He is alert.   Psychiatric:         Behavior: Behavior is cooperative.         Vital Signs  ED Triage Vitals [07/17/24 1239]   Temperature Pulse Respirations Blood Pressure SpO2   98.1 °F (36.7 °C) 80 17 122/84 95 %      Temp Source Heart Rate Source Patient Position - Orthostatic VS BP Location FiO2 (%)   Tympanic Monitor -- -- --      Pain Score       8           Vitals:    07/17/24 1239   BP: 122/84   Pulse: 80         Visual Acuity      ED Medications  Medications - No data to display    Diagnostic Studies  Results Reviewed       None                   No orders to display              Procedures  Procedures         ED Course                                 SBIRT 20yo+      Flowsheet Row Most Recent Value   Initial Alcohol Screen: US AUDIT-C     1. How often do you have a drink containing alcohol? 0 Filed at: 07/17/2024 1238   2. How many drinks containing alcohol do you have on a typical day you are drinking?  0 Filed at: 07/17/2024 1238   3a. Male UNDER 65: How often do you have five or more drinks on one occasion? 0 Filed at: 07/17/2024 1238   Audit-C Score 0 Filed at: 07/17/2024 1238   ALEXA: How many times in the past year have you...    Used an illegal drug or used a prescription medication for non-medical reasons? Never Filed at: 07/17/2024 1238                      Medical Decision Making  Vital signs stable.  Afebrile.  Positive tooth tapping tenderness.  Gingival swelling noted. No gum lesions. No abscesses noted.  Overall poor dentition.  No systemic symptoms.  No facial swelling, difficulty swallowing or breathing. No trismus. No voice changes. Tolerating PO without difficulty. No respiratory distress. No evidence of acute necrotizing  "ulcerative gingivitis. Low clinical suspicion for deep space infection at this time- do not suspect Ignacio's angina, peritonsillar abscess, retropharyngeal abscess, do not feel that additional work-up is necessary at this time including any blood work or CT imaging.  Plan to treat with antibiotics, pain control, with close dental follow-up.  Discussed importance of follow up with Dentist, antibiotic compliance.     All imaging and/or lab testing discussed with patient, strict return to ED precautions discussed. Patient recommended to follow up promptly with appropriate outpatient provider and risk of morbidity/mortality if patient does not follow up as recommended was discussed. Patient and/or family members verbalizes understanding and agrees with plan. Patient and/or family members were given opportunity to ask questions, all questions were answered at this time. Patient is stable for discharge.     Portions of the record may have been created with voice recognition software. Occasional wrong word or \"sound a like\" substitutions may have occurred due to the inherent limitations of voice recognition software. Read the chart carefully and recognize, using context, where substitutions have occurred.       Risk  OTC drugs.  Prescription drug management.                 Disposition  Final diagnoses:   Dental infection   Dentalgia     Time reflects when diagnosis was documented in both MDM as applicable and the Disposition within this note       Time User Action Codes Description Comment    7/17/2024 12:55 PM Josefa Azul Add [K04.7] Dental infection     7/17/2024 12:55 PM Josefa Azul Add [K08.89] Dentalgia           ED Disposition       ED Disposition   Discharge    Condition   Stable    Date/Time   Wed Jul 17, 2024 1257    Comment   Calderon Alvarado discharge to home/self care.                   Follow-up Information       Follow up With Specialties Details Why Contact Info Additional Information    [facility] " [facility] Schedule an appointment as soon as possible for a visit in 1 day For follow up regarding your symptoms [address]   [facility] [address] [phone] [facility], [address] , [place], [address], [phone]    [facility] Schedule an appointment as soon as possible for a visit in 1 day For follow up regarding your symptoms [address] [facility] [address]  [phone] [facility], [address] [place] [address], [address], [phone]            Discharge Medication List as of 7/17/2024 12:57 PM        START taking these medications    Details   acetaminophen (TYLENOL) 650 mg CR tablet Take 1 tablet (650 mg total) by mouth every 8 (eight) hours as needed for mild pain, Starting Wed 7/17/2024, Normal      amoxicillin-clavulanate (AUGMENTIN) 875-125 mg per tablet Take 1 tablet by mouth every 12 (twelve) hours for 7 days, Starting Wed 7/17/2024, Until Wed 7/24/2024, Normal      benzocaine (ORAJEL) 10 % mucosal gel Apply 1 Application to the mouth or throat 2 (two) times a day as needed (dental pain), Starting Wed 7/17/2024, Normal      chlorhexidine (PERIDEX) 0.12 % solution Apply 15 mL to the mouth or throat 2 (two) times a day, Starting Wed 7/17/2024, Normal           CONTINUE these medications which have NOT CHANGED    Details   Aspirin Low Dose 81 MG EC tablet Take 1 tablet (81 mg total) by mouth daily, Starting Wed 6/5/2024, Until Tue 9/3/2024, Normal      atorvastatin (LIPITOR) 80 mg tablet Take 1 tablet (80 mg total) by mouth daily, Starting Wed 6/5/2024, Normal      dextromethorphan-guaifenesin (MUCINEX DM)  MG per 12 hr tablet Take 1 tablet by mouth every 12 (twelve) hours, Starting Thu 6/20/2024, Normal      escitalopram (LEXAPRO) 20 mg tablet Take 1 tablet (20 mg total) by mouth daily, Starting Wed 6/5/2024, Until Tue 9/3/2024,  Normal      furosemide (LASIX) 20 mg tablet Take 1 tablet (20 mg total) by mouth 2 (two) times a day, Starting Tue 6/6/2023, Until Thu 10/5/2023, Normal      hydrochlorothiazide (MICROZIDE) 12.5 mg capsule Take 1 capsule (12.5 mg total) by mouth in the morning, Starting Fri 12/8/2023, Until Thu 3/7/2024, Normal      hydrocortisone (ANUSOL-HC) 2.5 % rectal cream Apply topically 2 (two) times a day, Starting Thu 6/20/2024, Normal      ketoconazole (NIZORAL) 2 % cream Apply topically daily, Starting Thu 6/20/2024, Normal      naloxone (NARCAN) 4 mg/0.1 mL nasal spray 0.1 mL (4 mg total) into each nostril every 3 (three) minutes as needed for opioid reversal or respiratory depression, Starting Tue 6/6/2023, Normal      QUEtiapine (SEROquel) 300 mg tablet Take 1 tablet (300 mg total) by mouth 2 (two) times a day, Starting Wed 6/5/2024, Until Tue 9/3/2024, Normal      sodium chloride (OCEAN) 0.65 % nasal spray 1 spray into each nostril as needed for congestion (as needed for congestion), Starting Fri 6/14/2024, Until Sat 6/14/2025 at 2359, Normal      witch hazel-glycerin (TUCKS) topical pad Insert 1 Pad into the rectum as needed for irritation, Starting Wed 11/29/2023, Normal             No discharge procedures on file.    PDMP Review         Value Time User    PDMP Reviewed  Yes 6/20/2024 10:04 AM Noni Hanna MD            ED Provider  Electronically Signed by             Josefa Azul PA-C  07/17/24 9636

## 2024-07-17 NOTE — DISCHARGE INSTRUCTIONS
Follow up with Dentist. Take antibiotic as prescribed.  Return to ED sooner for new or worsening symptoms as discussed.

## 2024-07-18 ENCOUNTER — TELEPHONE (OUTPATIENT)
Age: 54
End: 2024-07-18

## 2024-07-18 NOTE — TELEPHONE ENCOUNTER
----- Message from Pascual Brink MD sent at 7/18/2024  1:20 PM EDT -----  Please call Calderon,    His cholesterol is still elevated with an LDL of 99.  It is certainly much better than it was a year ago, but ideally we are looking for that LDL cholesterol to be less than 70.  Has she been taking the atorvastatin every day?  If he has, we would likely switch it to rosuvastatin 40 mg and recheck in 3 months.  Just let me know and I can enter the appropriate prescriptions and follow-up labs.    Thanks    Dr VILLANUEVA

## 2024-07-19 NOTE — TELEPHONE ENCOUNTER
Called pt using [employer]  #[id number]. The phone number for pt is actually Arnaldo Lemos. The  attempted twice to push the button for the [facility], but was unsuccessful.  did state that they are having issues dialing out and connecting calls today. Will attempt to contact pt at a later time.

## 2024-07-23 NOTE — TELEPHONE ENCOUNTER
Called pt using [employer]  #[id number]. Again the  attempted to select the option for the [facility], but the call was not transferred.

## 2024-08-21 ENCOUNTER — TELEPHONE (OUTPATIENT)
Age: 54
End: 2024-08-21

## 2024-08-21 DIAGNOSIS — E78.5 DYSLIPIDEMIA: ICD-10-CM

## 2024-08-21 DIAGNOSIS — Z86.73 HISTORY OF STROKE: Primary | ICD-10-CM

## 2024-08-21 NOTE — TELEPHONE ENCOUNTER
Inbound call received from patient. Pt requested . RN connected with  [facility], ID # [id number]    Pt stated he received a letter from the Neurology office. RN reviewed chart, per review pt was contacted last month to discuss lab results and a letter was sent to pt. Review letter/message from Dr. Brink.     Pt stated that he was taking the Atorvastatin daily and is agreeable to Dr. Brink switching to Rosuvastatin 40 mg and have it sent to  Pharmacy on [address]. Pt aware to get labs done in 3 months.       Dr. Saenz: pt returning call from last month's lab results message with regards to LDL of 99. Pt is agreeable to switching to Rosuvastatin 40 mg and have it sent to  Pharmacy on [address]. Please advise.

## 2024-08-22 PROBLEM — E78.5 DYSLIPIDEMIA: Status: ACTIVE | Noted: 2024-08-22

## 2024-08-22 RX ORDER — ROSUVASTATIN CALCIUM 40 MG/1
40 TABLET, COATED ORAL EVERY EVENING
Qty: 90 TABLET | Refills: 2 | Status: SHIPPED | OUTPATIENT
Start: 2024-08-22

## 2024-09-11 ENCOUNTER — OFFICE VISIT (OUTPATIENT)
Dept: DENTISTRY | Facility: CLINIC | Age: 54
End: 2024-09-11

## 2024-09-11 VITALS — DIASTOLIC BLOOD PRESSURE: 80 MMHG | SYSTOLIC BLOOD PRESSURE: 125 MMHG | HEART RATE: 73 BPM | TEMPERATURE: 98.2 F

## 2024-09-11 DIAGNOSIS — Z01.21 ENCOUNTER FOR DENTAL EXAMINATION AND CLEANING WITH ABNORMAL FINDINGS: Primary | ICD-10-CM

## 2024-09-11 PROCEDURE — D0330 PANORAMIC RADIOGRAPHIC IMAGE: HCPCS | Performed by: DENTAL HYGIENIST

## 2024-09-11 PROCEDURE — D0150 COMPREHENSIVE ORAL EVALUATION - NEW OR ESTABLISHED PATIENT: HCPCS | Performed by: DENTIST

## 2024-09-11 PROCEDURE — D0220 INTRAORAL - PERIAPICAL FIRST RADIOGRAPHIC IMAGE: HCPCS | Performed by: DENTAL HYGIENIST

## 2024-09-11 PROCEDURE — D0230 INTRAORAL - PERIAPICAL EACH ADDITIONAL RADIOGRAPHIC IMAGE: HCPCS | Performed by: DENTAL HYGIENIST

## 2024-11-04 ENCOUNTER — TELEPHONE (OUTPATIENT)
Dept: FAMILY MEDICINE CLINIC | Facility: CLINIC | Age: 54
End: 2024-11-04

## 2024-11-04 NOTE — TELEPHONE ENCOUNTER
PCP SIGNATURE NEEDED FOR [facility] FORM RECEIVED VIA FAX AND PLACED IN PCP FOLDER TO BE DELIVERED AT ASSIGNED TIMES.    Script for digital blood pressure

## 2024-11-19 DIAGNOSIS — I10 PRIMARY HYPERTENSION: Primary | ICD-10-CM

## 2024-11-19 RX ORDER — BLOOD PRESSURE TEST KIT
KIT MISCELLANEOUS
Qty: 1 KIT | Refills: 0 | Status: SHIPPED | OUTPATIENT
Start: 2024-11-19

## 2024-11-19 RX ORDER — BLOOD PRESSURE TEST KIT
KIT MISCELLANEOUS DAILY
Qty: 1 KIT | Refills: 0 | Status: SHIPPED | OUTPATIENT
Start: 2024-11-19 | End: 2024-11-19

## 2024-11-19 NOTE — LETTER
"  November 19, 2024     Patient: Calderon Alvarado  YOB: 1970  Date of Visit: 11/19/2024      To Whom it May Concern:    Calderon Alvarado is under my professional care. We have ordered a blood pressure kit for him to monitor his blood pressure. Previously he was on medication however now he is not. We recommend he log his blood pressure in the evenings at home to help determine the need to restart his blood pressure medication. His most recent office visit revealed a height of 5'9\", weight of 97 kg (213 lb 12.8 oz), and the diagnosis code for primary hypertension is l10.0    If you have any questions or concerns, please don't hesitate to call.         Sincerely,          Rebecca Fay Kab-Perlman, MD      "

## 2024-11-19 NOTE — PROGRESS NOTES
Completed requested forms for blood pressure kit approval through medicaid care coordinator and placed in fax bin with directions and requested to be scanned into record after faxing.

## 2024-12-02 ENCOUNTER — OFFICE VISIT (OUTPATIENT)
Dept: FAMILY MEDICINE CLINIC | Facility: CLINIC | Age: 54
End: 2024-12-02

## 2024-12-02 VITALS
TEMPERATURE: 97.9 F | HEIGHT: 69 IN | OXYGEN SATURATION: 97 % | RESPIRATION RATE: 20 BRPM | WEIGHT: 210.4 LBS | BODY MASS INDEX: 31.16 KG/M2 | SYSTOLIC BLOOD PRESSURE: 112 MMHG | HEART RATE: 70 BPM | DIASTOLIC BLOOD PRESSURE: 76 MMHG

## 2024-12-02 DIAGNOSIS — F25.8 OTHER SCHIZOAFFECTIVE DISORDERS (HCC): ICD-10-CM

## 2024-12-02 DIAGNOSIS — Z86.73 HISTORY OF STROKE: ICD-10-CM

## 2024-12-02 DIAGNOSIS — Z79.899 MEDICATION MANAGEMENT: ICD-10-CM

## 2024-12-02 DIAGNOSIS — Z72.0 TOBACCO USE: ICD-10-CM

## 2024-12-02 DIAGNOSIS — R51.9 NONINTRACTABLE EPISODIC HEADACHE, UNSPECIFIED HEADACHE TYPE: Primary | ICD-10-CM

## 2024-12-02 DIAGNOSIS — H11.003 PTERYGIUM OF BOTH EYES: ICD-10-CM

## 2024-12-02 PROBLEM — Z76.0 MEDICATION REFILL: Status: RESOLVED | Noted: 2023-06-06 | Resolved: 2024-12-02

## 2024-12-02 PROCEDURE — 99213 OFFICE O/P EST LOW 20 MIN: CPT | Performed by: INTERNAL MEDICINE

## 2024-12-02 RX ORDER — QUETIAPINE FUMARATE 100 MG/1
TABLET, FILM COATED ORAL
Qty: 9 TABLET | Refills: 0 | Status: SHIPPED | OUTPATIENT
Start: 2024-12-02

## 2024-12-02 RX ORDER — ATORVASTATIN CALCIUM 80 MG/1
80 TABLET, FILM COATED ORAL DAILY
Qty: 90 TABLET | Refills: 3 | Status: SHIPPED | OUTPATIENT
Start: 2024-12-02

## 2024-12-02 RX ORDER — IBUPROFEN 600 MG/1
600 TABLET, FILM COATED ORAL EVERY 6 HOURS PRN
Qty: 60 TABLET | Refills: 0 | Status: SHIPPED | OUTPATIENT
Start: 2024-12-02

## 2024-12-02 RX ORDER — ROSUVASTATIN CALCIUM 40 MG/1
40 TABLET, COATED ORAL EVERY EVENING
Qty: 90 TABLET | Refills: 2 | Status: SHIPPED | OUTPATIENT
Start: 2024-12-02 | End: 2024-12-02

## 2024-12-02 RX ORDER — QUETIAPINE FUMARATE 25 MG/1
TABLET, FILM COATED ORAL
Qty: 8 TABLET | Refills: 0 | Status: SHIPPED | OUTPATIENT
Start: 2024-12-02

## 2024-12-02 RX ORDER — QUETIAPINE FUMARATE 300 MG/1
TABLET, FILM COATED ORAL
Qty: 60 TABLET | Refills: 2 | Status: SHIPPED | OUTPATIENT
Start: 2024-12-02

## 2024-12-02 RX ORDER — ASPIRIN 81 MG/1
81 TABLET, COATED ORAL DAILY
Qty: 90 TABLET | Refills: 3 | Status: SHIPPED | OUTPATIENT
Start: 2024-12-02

## 2024-12-02 NOTE — ASSESSMENT & PLAN NOTE
-current 1 ppd smoker  ->20 pack year history     PLAN  -on f/u may discuss CT lung cancer screening

## 2024-12-02 NOTE — ASSESSMENT & PLAN NOTE
Orders:    rosuvastatin (CRESTOR) 40 MG tablet; Take 1 tablet (40 mg total) by mouth every evening

## 2024-12-02 NOTE — PROGRESS NOTES
Name: Calderon Alvarado      : 1970      MRN: [id number]  Encounter Provider: Rebecca Fay Kab-Perlman, MD  Encounter Date: 2024   Encounter department: [facility]  :  Assessment & Plan  Medication management  -today patient states he has not used his medications for months, seems confused at baseline, providing different information at different times  -today I called [facility] and spoke to Gerard to clarify medication process; was informed that [facility] administers the medications however once you graduate the program like Calderon has, they no longer administer or track the medications; Ankit asking Calderon when he ran out of medications while I was on the phone with him and Calderon stated one month ago  -called  pharmacy for medication reconciliation:   + Seroquel 300mg BID last picked up in 2024, a 3 month supply therefore has not used since 2024 about 6 months ago  + atorvastatin 80mg last time picked up was 2024, was a 3 month supply with one refill therefore has not used since 2024  + Aspirin 81mg last time picked up was 2024, a 3 month supply, has not used since 2024  + lexapro 20mg last time picked up was 2024, a 3 month supply, has not used since 2024  + HCTZ 12.5mg last time picked up was 2023 a 3 month supply has not used since 2024    PLAN  -f/u 3 weeks  -see schizoaffective disorder A/P    Orders:    atorvastatin (LIPITOR) 80 mg tablet; Take 1 tablet (80 mg total) by mouth daily    History of stroke  -requesting refills of both aspirin and rosuvastatin    PLAN  -refills provided    Orders:    Aspirin Low Dose 81 MG EC tablet; Take 1 tablet (81 mg total) by mouth daily    atorvastatin (LIPITOR) 80 mg tablet; Take 1 tablet (80 mg total) by mouth daily    Nonintractable episodic headache, unspecified headache type  -may be secondary to lack of medications vs. migraine headache  -allergies reviewed; although  ibuprofen allergy is listed unclear reaction and states he is not allergic to NSAIDS    PLAN  -ibuprofen for abortive therapy with education/counseling regarding ibuprofen and to separate use of ibuprofen and aspirin by minimum 4 hours    Orders:    ibuprofen (MOTRIN) 600 mg tablet; Take 1 tablet (600 mg total) by mouth every 6 (six) hours as needed for mild pain    Tobacco use  -current 1 ppd smoker  ->20 pack year history     PLAN  -on f/u may discuss CT lung cancer screening         Other schizoaffective disorders (HCC)  -currently off all medications and no SI/HI/AVH    PLAN  -will restart seroquel today and titrate up: will work up to 300mg BID then transition to 600 ER hs  -called [facility] and spoke with Ankit again; communicated plan and will pass information along to Calderon, namely to  medications at pharmacy; stressed use of aspirin and atorvastatin in setting of history of stroke    Orders:    SEROquel 25 MG tablet; 25mg BID day 1 followed by 75mg BID day 2    QUEtiapine (SEROquel) 100 mg tablet; 150mg BID day 3 which is equivalent to a tablet and a half  followed by 300mg BID day 4 which is equivalent to three tablets twice daily    QUEtiapine (SEROquel) 300 mg tablet; One tablet twice daily on day 5 and thereafter    Pterygium of both eyes  -noted on physical exam    PLAN  -on f/u refer to ophthalmology  -education/counseling provided for patient today                 History of Present Illness     Interpretor Used: [id number]  Calderon ALLEN Christiano is a 55 yo male patient presenting today for headaches. Headaches began four days ago. Positive for photophobia, phonophobia, and aura. Has been having migraines for past 10 years. Percocet makes it better. Is In [facility] [facility] program and his friend was giving him percocets; has been using every 5 to 6 hours.  Nothing else makes it better.     The migraines occur suddenly, this feels like the worst migraine he has had. They are intermittent and sometime shad  visual vlurriness, rhinorrhea, and lacrimation.     Unable to recall the last time he used his seroquel.   Uses  pharmacy.   Currently not on suboxone  Last used percocet like 5/6 days ago however told medical student who collected information for me that he used it in the past 24 hours.    Patient providing differing information, poor historian. This most recent episode began 4 days ago.    States no allergies     Diagnosed with migraine headaches in the past  Poor sleep; about 5/6 hours nightly with night time awakenings. Gets less headaches when on medications. No exercise. Diet is healthy. [facility] provides with food. Also gets food stamps. Not currently working.     Stroke in 2018  Has family in [place]       Review of Systems   Constitutional:  Negative for fever.   Respiratory:  Negative for shortness of breath.    Cardiovascular:  Negative for chest pain and palpitations.   Skin:  Negative for rash.   Neurological:  Positive for headaches. Negative for dizziness, seizures, syncope, facial asymmetry, speech difficulty, weakness, light-headedness and numbness.   Psychiatric/Behavioral:  Positive for confusion. Negative for hallucinations and suicidal ideas. The patient is not nervous/anxious.      Pertinent Medical History   Hx CVA     Medical History Reviewed by provider this encounter:     .  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 Added automatically from request for surgery [id number]    Hep C w/o coma, chronic (HCC) 10/4/2018    Nocturnal headaches 5/5/2022    Opiate dependence (HCC) 7/11/2021    Formatting of this note might be different from the original. Started suboxone    Other schizoaffective disorders (HCC) 6/6/2023    Right sided weakness 12/17/2021    Spasm  5/5/2022    Stroke (HCC)      No past surgical history on file.  No family history on file.   reports that he has been smoking cigarettes. He has never been exposed to tobacco smoke. He has never used smokeless tobacco. He reports that he does not currently use alcohol. He reports that he does not currently use drugs.  Current Outpatient Medications on File Prior to Visit   Medication Sig Dispense Refill    acetaminophen (TYLENOL) 650 mg CR tablet Take 1 tablet (650 mg total) by mouth every 8 (eight) hours as needed for mild pain 30 tablet 0    benzocaine (ORAJEL) 10 % mucosal gel Apply 1 Application to the mouth or throat 2 (two) times a day as needed (dental pain) 9 g 0    Blood Pressure KIT Check blood pressure daily at night 1 kit 0    chlorhexidine (PERIDEX) 0.12 % solution Apply 15 mL to the mouth or throat 2 (two) times a day 120 mL 0    dextromethorphan-guaifenesin (MUCINEX DM)  MG per 12 hr tablet Take 1 tablet by mouth every 12 (twelve) hours 30 tablet 0    escitalopram (LEXAPRO) 20 mg tablet Take 1 tablet (20 mg total) by mouth daily 90 tablet 0    furosemide (LASIX) 20 mg tablet Take 1 tablet (20 mg total) by mouth 2 (two) times a day 180 tablet 0    hydrochlorothiazide (MICROZIDE) 12.5 mg capsule Take 1 capsule (12.5 mg total) by mouth in the morning 90 capsule 0    hydrocortisone (ANUSOL-HC) 2.5 % rectal cream Apply topically 2 (two) times a day 28 g 1    ketoconazole (NIZORAL) 2 % cream Apply topically daily 60 g 1    naloxone (NARCAN) 4 mg/0.1 mL nasal spray 0.1 mL (4 mg total) into each nostril every 3 (three) minutes as needed for opioid reversal or respiratory depression 2 each 2    QUEtiapine (SEROquel) 300 mg tablet Take 1 tablet (300 mg total) by mouth 2 (two) times a day 180 tablet 0    sodium chloride (OCEAN) 0.65 % nasal spray 1 spray into each nostril as needed for congestion (as needed for congestion) 15 mL 0    witch hazel-glycerin (TUCKS) topical pad Insert 1 Pad into the rectum as  needed for irritation (Patient not taking: Reported on 12/8/2023) 100 each 0    [DISCONTINUED] Aspirin Low Dose 81 MG EC tablet Take 1 tablet (81 mg total) by mouth daily 90 tablet 0    [DISCONTINUED] rosuvastatin (CRESTOR) 40 MG tablet Take 1 tablet (40 mg total) by mouth every evening 90 tablet 2     No current facility-administered medications on file prior to visit.     No Active Allergies     Current Outpatient Medications on File Prior to Visit   Medication Sig Dispense Refill    acetaminophen (TYLENOL) 650 mg CR tablet Take 1 tablet (650 mg total) by mouth every 8 (eight) hours as needed for mild pain 30 tablet 0    benzocaine (ORAJEL) 10 % mucosal gel Apply 1 Application to the mouth or throat 2 (two) times a day as needed (dental pain) 9 g 0    Blood Pressure KIT Check blood pressure daily at night 1 kit 0    chlorhexidine (PERIDEX) 0.12 % solution Apply 15 mL to the mouth or throat 2 (two) times a day 120 mL 0    dextromethorphan-guaifenesin (MUCINEX DM)  MG per 12 hr tablet Take 1 tablet by mouth every 12 (twelve) hours 30 tablet 0    escitalopram (LEXAPRO) 20 mg tablet Take 1 tablet (20 mg total) by mouth daily 90 tablet 0    furosemide (LASIX) 20 mg tablet Take 1 tablet (20 mg total) by mouth 2 (two) times a day 180 tablet 0    hydrochlorothiazide (MICROZIDE) 12.5 mg capsule Take 1 capsule (12.5 mg total) by mouth in the morning 90 capsule 0    hydrocortisone (ANUSOL-HC) 2.5 % rectal cream Apply topically 2 (two) times a day 28 g 1    ketoconazole (NIZORAL) 2 % cream Apply topically daily 60 g 1    naloxone (NARCAN) 4 mg/0.1 mL nasal spray 0.1 mL (4 mg total) into each nostril every 3 (three) minutes as needed for opioid reversal or respiratory depression 2 each 2    QUEtiapine (SEROquel) 300 mg tablet Take 1 tablet (300 mg total) by mouth 2 (two) times a day 180 tablet 0    sodium chloride (OCEAN) 0.65 % nasal spray 1 spray into each nostril as needed for congestion (as needed for congestion) 15  "mL 0    witch hazel-glycerin (TUCKS) topical pad Insert 1 Pad into the rectum as needed for irritation (Patient not taking: Reported on 12/8/2023) 100 each 0    [DISCONTINUED] Aspirin Low Dose 81 MG EC tablet Take 1 tablet (81 mg total) by mouth daily 90 tablet 0    [DISCONTINUED] rosuvastatin (CRESTOR) 40 MG tablet Take 1 tablet (40 mg total) by mouth every evening 90 tablet 2     No current facility-administered medications on file prior to visit.      Social History     Tobacco Use    Smoking status: Every Day     Current packs/day: 1.00     Types: Cigarettes     Passive exposure: Never    Smokeless tobacco: Never   Vaping Use    Vaping status: Never Used   Substance and Sexual Activity    Alcohol use: Not Currently    Drug use: Not Currently    Sexual activity: Not on file        Objective   /76 (BP Location: Left arm, Patient Position: Sitting, Cuff Size: Large)   Pulse 70   Temp 97.9 °F (36.6 °C) (Temporal)   Resp 20   Ht 5' 9\" (1.753 m)   Wt 95.4 kg (210 lb 6.4 oz)   SpO2 97%   BMI 31.07 kg/m²      Physical Exam  Constitutional:       Appearance: Normal appearance.   HENT:      Head: Normocephalic and atraumatic.   Eyes:      General: No scleral icterus.     Conjunctiva/sclera: Conjunctivae normal.      Comments: Bilateral scar tissue on medial cornea extending from medial to lateral over part of the bilateral medial iris   Cardiovascular:      Rate and Rhythm: Normal rate and regular rhythm.      Heart sounds: Murmur heard.      No friction rub. No gallop.   Pulmonary:      Breath sounds: Normal breath sounds. No wheezing, rhonchi or rales.   Skin:     General: Skin is warm.   Neurological:      General: No focal deficit present.      Mental Status: He is alert and oriented to person, place, and time.      Cranial Nerves: No cranial nerve deficit.      Sensory: No sensory deficit.      Motor: No weakness.      Coordination: Coordination normal.      Gait: Gait normal.      Comments: " -Finger-to-nose testing normal  -No Dysdiadochokinesia      Psychiatric:         Mood and Affect: Mood normal.      Comments: -confused about medications

## 2024-12-02 NOTE — ASSESSMENT & PLAN NOTE
-currently off all medications and no SI/HI/AVH    PLAN  -will restart seroquel today and titrate up: will work up to 300mg BID then transition to 600 ER hs  -called [facility] and spoke with Ankit again; communicated plan and will pass information along to Calderon, namely to  medications at pharmacy; stressed use of aspirin and atorvastatin in setting of history of stroke    Orders:    SEROquel 25 MG tablet; 25mg BID day 1 followed by 75mg BID day 2    QUEtiapine (SEROquel) 100 mg tablet; 150mg BID day 3 which is equivalent to a tablet and a half  followed by 300mg BID day 4 which is equivalent to three tablets twice daily    QUEtiapine (SEROquel) 300 mg tablet; One tablet twice daily on day 5 and thereafter

## 2024-12-02 NOTE — ASSESSMENT & PLAN NOTE
-noted on physical exam    PLAN  -on f/u refer to ophthalmology  -education/counseling provided for patient today

## 2024-12-02 NOTE — ASSESSMENT & PLAN NOTE
-today patient states he has not used his medications for months, seems confused at baseline, providing different information at different times  -today I called [facility] and spoke to Gerard to clarify medication process; was informed that [facility] administers the medications however once you graduate the program like Calderon has, they no longer administer or track the medications; Ankit asking Calderon when he ran out of medications while I was on the phone with him and Calderon stated one month ago  -called  pharmacy for medication reconciliation:   + Seroquel 300mg BID last picked up in April 2024, a 3 month supply therefore has not used since July 2024 about 6 months ago  + atorvastatin 80mg last time picked up was April 2024, was a 3 month supply with one refill therefore has not used since July 2024  + Aspirin 81mg last time picked up was June 2024, a 3 month supply, has not used since 09/2024  + lexapro 20mg last time picked up was June 2024, a 3 month supply, has not used since 09/2024  + HCTZ 12.5mg last time picked up was 12/2023 a 3 month supply has not used since 03/2024    PLAN  -f/u 3 weeks  -see schizoaffective disorder A/P    Orders:    atorvastatin (LIPITOR) 80 mg tablet; Take 1 tablet (80 mg total) by mouth daily

## 2024-12-02 NOTE — ASSESSMENT & PLAN NOTE
-requesting refills of both aspirin and rosuvastatin    PLAN  -refills provided    Orders:    Aspirin Low Dose 81 MG EC tablet; Take 1 tablet (81 mg total) by mouth daily    atorvastatin (LIPITOR) 80 mg tablet; Take 1 tablet (80 mg total) by mouth daily

## 2025-01-09 ENCOUNTER — OFFICE VISIT (OUTPATIENT)
Dept: FAMILY MEDICINE CLINIC | Facility: CLINIC | Age: 55
End: 2025-01-09

## 2025-01-09 VITALS
WEIGHT: 201 LBS | DIASTOLIC BLOOD PRESSURE: 80 MMHG | OXYGEN SATURATION: 98 % | SYSTOLIC BLOOD PRESSURE: 114 MMHG | HEIGHT: 69 IN | TEMPERATURE: 97.9 F | BODY MASS INDEX: 29.77 KG/M2 | RESPIRATION RATE: 18 BRPM | HEART RATE: 59 BPM

## 2025-01-09 DIAGNOSIS — Z00.00 ANNUAL PHYSICAL EXAM: Primary | ICD-10-CM

## 2025-01-09 DIAGNOSIS — Z20.822 EXPOSURE TO COVID-19 VIRUS: ICD-10-CM

## 2025-01-09 DIAGNOSIS — F25.8 OTHER SCHIZOAFFECTIVE DISORDERS (HCC): ICD-10-CM

## 2025-01-09 LAB
SARS-COV-2 AG UPPER RESP QL IA: NEGATIVE
VALID CONTROL: NORMAL

## 2025-01-09 PROCEDURE — 99213 OFFICE O/P EST LOW 20 MIN: CPT

## 2025-01-09 PROCEDURE — 99396 PREV VISIT EST AGE 40-64: CPT

## 2025-01-09 PROCEDURE — 87811 SARS-COV-2 COVID19 W/OPTIC: CPT

## 2025-01-09 RX ORDER — ESCITALOPRAM OXALATE 20 MG/1
20 TABLET ORAL DAILY
Qty: 90 TABLET | Refills: 1 | Status: SHIPPED | OUTPATIENT
Start: 2025-01-09

## 2025-01-09 NOTE — PATIENT INSTRUCTIONS
"Patient Education     Routine physical for adults   The Basics   Written by the doctors and editors at [facility]   What is a physical? -- A physical is a routine visit, or \"check-up,\" with your doctor. You might also hear it called a \"wellness visit\" or \"preventive visit.\"  During each visit, the doctor will:   Ask about your physical and mental health   Ask about your habits, behaviors, and lifestyle   Do an exam   Give you vaccines if needed   Talk to you about any medicines you take   Give advice about your health   Answer your questions  Getting regular check-ups is an important part of taking care of your health. It can help your doctor find and treat any problems you have. But it's also important for preventing health problems.  A routine physical is different from a \"sick visit.\" A sick visit is when you see a doctor because of a health concern or problem. Since physicals are scheduled ahead of time, you can think about what you want to ask the doctor.  How often should I get a physical? -- It depends on your age and health. In general, for people age 21 years and older:   If you are younger than 50 years, you might be able to get a physical every 3 years.   If you are 50 years or older, your doctor might recommend a physical every year.  If you have an ongoing health condition, like diabetes or high blood pressure, your doctor will probably want to see you more often.  What happens during a physical? -- In general, each visit will include:   Physical exam - The doctor or nurse will check your height, weight, heart rate, and blood pressure. They will also look at your eyes and ears. They will ask about how you are feeling and whether you have any symptoms that bother you.   Medicines - It's a good idea to bring a list of all the medicines you take to each doctor visit. Your doctor will talk to you about your medicines and answer any questions. Tell them if you are having any side effects that bother you. You " "should also tell them if you are having trouble paying for any of your medicines.   Habits and behaviors - This includes:   Your diet   Your exercise habits   Whether you smoke, drink alcohol, or use drugs   Whether you are sexually active   Whether you feel safe at home  Your doctor will talk to you about things you can do to improve your health and lower your risk of health problems. They will also offer help and support. For example, if you want to quit smoking, they can give you advice and might prescribe medicines. If you want to improve your diet or get more physical activity, they can help you with this, too.   Lab tests, if needed - The tests you get will depend on your age and situation. For example, your doctor might want to check your:   Cholesterol   Blood sugar   Iron level   Vaccines - The recommended vaccines will depend on your age, health, and what vaccines you already had. Vaccines are very important because they can prevent certain serious or deadly infections.   Discussion of screening - \"Screening\" means checking for diseases or other health problems before they cause symptoms. Your doctor can recommend screening based on your age, risk, and preferences. This might include tests to check for:   Cancer, such as breast, prostate, cervical, ovarian, colorectal, prostate, lung, or skin cancer   Sexually transmitted infections, such as chlamydia and gonorrhea   Mental health conditions like depression and anxiety  Your doctor will talk to you about the different types of screening tests. They can help you decide which screenings to have. They can also explain what the results might mean.   Answering questions - The physical is a good time to ask the doctor or nurse questions about your health. If needed, they can refer you to other doctors or specialists, too.  Adults older than 65 years often need other care, too. As you get older, your doctor will talk to you about:   How to prevent falling at " home   Hearing or vision tests   Memory testing   How to take your medicines safely   Making sure that you have the help and support you need at home  All topics are updated as new evidence becomes available and our peer review process is complete.  This topic retrieved from [employer] on: May 02, 2024.  Topic 885528 Version 1.0  Release: 32.4.3 - C32.122  © 2024 UpToDate, Inc. and/or its affiliates. All rights reserved.  Consumer Information Use and Disclaimer   Disclaimer: This generalized information is a limited summary of diagnosis, treatment, and/or medication information. It is not meant to be comprehensive and should be used as a tool to help the user understand and/or assess potential diagnostic and treatment options. It does NOT include all information about conditions, treatments, medications, side effects, or risks that may apply to a specific patient. It is not intended to be medical advice or a substitute for the medical advice, diagnosis, or treatment of a health care provider based on the health care provider's examination and assessment of a patient's specific and unique circumstances. Patients must speak with a health care provider for complete information about their health, medical questions, and treatment options, including any risks or benefits regarding use of medications. This information does not endorse any treatments or medications as safe, effective, or approved for treating a specific patient. UpToDate, Inc. and its affiliates disclaim any warranty or liability relating to this information or the use thereof.The use of this information is governed by the Terms of Use, available at https://www.woltersRoposouwer.com/en/know/clinical-effectiveness-terms. 2024© UpToDate, Inc. and its affiliates and/or licensors. All rights reserved.  Copyright   © 2024 UpToDate, Inc. and/or its affiliates. All rights reserved.

## 2025-01-09 NOTE — ASSESSMENT & PLAN NOTE
-Denies SI/HI/AVH  -Reports compliance with Seroquel 300 mg BID  -F/U with PCP  Orders:    escitalopram (LEXAPRO) 20 mg tablet; Take 1 tablet (20 mg total) by mouth daily

## 2025-01-09 NOTE — PROGRESS NOTES
Adult Annual Physical  Name: Calderon lAvarado      : 1970      MRN: [id number]  Encounter Provider: ROHINI Johns  Encounter Date: 2025   Encounter department: [facility]    Assessment & Plan  Annual physical exam         Exposure to COVID-19 virus  -POCT COVID test negative    Orders:    POCT Rapid Covid Ag    Other schizoaffective disorders (HCC)  -Denies SI/HI/AVH  -Reports compliance with Seroquel 300 mg BID  -F/U with PCP  Orders:    escitalopram (LEXAPRO) 20 mg tablet; Take 1 tablet (20 mg total) by mouth daily      Immunizations and preventive care screenings were discussed with patient today. Appropriate education was printed on patient's after visit summary.    Discussed risks and benefits of prostate cancer screening. We discussed the controversial history of PSA screening for prostate cancer in the [place] as well as the risk of over detection and over treatment of prostate cancer by way of PSA screening.  The patient understands that PSA blood testing is an imperfect way to screen for prostate cancer and that elevated PSA levels in the blood may also be caused by infection, inflammation, prostatic trauma or manipulation, urological procedures, or by benign prostatic enlargement.    The role of the digital rectal examination in prostate cancer screening was also discussed and I discussed with him that there is large interobserver variability in the findings of digital rectal examination.    Counseling:  Alcohol/drug use: discussed moderation in alcohol intake, the recommendations for healthy alcohol use, and avoidance of illicit drug use.  Dental Health: discussed importance of regular tooth brushing, flossing, and dental visits.  Injury prevention: discussed safety/seat belts, safety helmets, smoke detectors, carbon monoxide detectors, and smoking near bedding or upholstery.  Sexual health: discussed sexually transmitted diseases, partner  selection, use of condoms, avoidance of unintended pregnancy, and contraceptive alternatives.  Exercise: the importance of regular exercise/physical activity was discussed. Recommend exercise 3-5 times per week for at least 30 minutes.          History of Present Illness     Adult Annual Physical:  Patient presents for annual physical. Calderon Alvarado is a 54 y.o. 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       Patient is here requesting COVID testing following close contact with an individual who is positive with COVID. Patient denies any symptoms..     Diet and Physical Activity:  - Diet/Nutrition: well balanced diet.  - Exercise: no formal exercise.    General Health:  - Sleep: sleeps well.  - Hearing: normal hearing bilateral ears.  - Vision: no vision problems.  - Dental: brushes teeth twice daily.     Health:  - History of STDs: no.   - Urinary symptoms: none.     Review of Systems   Constitutional: Negative.  Negative for chills and fever.   HENT: Negative.  Negative for ear pain and sore throat.    Eyes: Negative.  Negative for pain and visual disturbance.   Respiratory: Negative.  Negative for cough and shortness of breath.    Cardiovascular: Negative.  Negative for chest pain and palpitations.   Gastrointestinal: Negative.  Negative for abdominal pain and vomiting.   Endocrine: Negative.    Genitourinary: Negative.  Negative for dysuria and hematuria.   Musculoskeletal: Negative.  Negative for arthralgias and back pain.   Skin: Negative.  Negative for color change and rash.   Neurological: Negative.  Negative for seizures and syncope.   Hematological: Negative.    Psychiatric/Behavioral: Negative.     All other systems reviewed and are  "negative.        Objective   /80 (BP Location: Left arm, Patient Position: Sitting, Cuff Size: Standard)   Pulse 59   Temp 97.9 °F (36.6 °C) (Temporal)   Resp 18   Ht 5' 9\" (1.753 m)   Wt 91.2 kg (201 lb)   SpO2 98%   BMI 29.68 kg/m²     Physical Exam  Vitals and nursing note reviewed.   Constitutional:       General: He is not in acute distress.     Appearance: Normal appearance. He is well-developed.   HENT:      Head: Normocephalic and atraumatic.      Right Ear: Tympanic membrane normal.      Left Ear: Tympanic membrane normal.      Nose: Nose normal.      Mouth/Throat:      Mouth: Mucous membranes are moist.      Pharynx: Oropharynx is clear.   Eyes:      Conjunctiva/sclera: Conjunctivae normal.   Cardiovascular:      Rate and Rhythm: Normal rate and regular rhythm.      Pulses: Normal pulses.      Heart sounds: Normal heart sounds. No murmur heard.  Pulmonary:      Effort: Pulmonary effort is normal. No respiratory distress.      Breath sounds: Normal breath sounds.   Abdominal:      General: Abdomen is flat. Bowel sounds are normal.      Palpations: Abdomen is soft.      Tenderness: There is no abdominal tenderness.   Musculoskeletal:         General: No swelling. Normal range of motion.      Cervical back: Normal range of motion and neck supple.   Skin:     General: Skin is warm and dry.      Capillary Refill: Capillary refill takes less than 2 seconds.   Neurological:      General: No focal deficit present.      Mental Status: He is alert and oriented to person, place, and time. Mental status is at baseline.   Psychiatric:         Mood and Affect: Mood normal.         Behavior: Behavior normal.         Thought Content: Thought content normal.         Judgment: Judgment normal.         "

## 2025-02-06 ENCOUNTER — TELEPHONE (OUTPATIENT)
Dept: FAMILY MEDICINE CLINIC | Facility: CLINIC | Age: 55
End: 2025-02-06

## 2025-02-06 NOTE — TELEPHONE ENCOUNTER
Called to change appt to virtual due to inclement weather. Unable to reach or leave vm. If pt contact office please assist. Thank you!

## 2025-02-12 ENCOUNTER — OFFICE VISIT (OUTPATIENT)
Dept: FAMILY MEDICINE CLINIC | Facility: CLINIC | Age: 55
End: 2025-02-12

## 2025-02-12 VITALS
WEIGHT: 205 LBS | TEMPERATURE: 98.6 F | BODY MASS INDEX: 30.36 KG/M2 | SYSTOLIC BLOOD PRESSURE: 112 MMHG | HEIGHT: 69 IN | DIASTOLIC BLOOD PRESSURE: 70 MMHG | OXYGEN SATURATION: 97 % | HEART RATE: 94 BPM | RESPIRATION RATE: 18 BRPM

## 2025-02-12 DIAGNOSIS — Z86.73 HISTORY OF STROKE: ICD-10-CM

## 2025-02-12 DIAGNOSIS — F25.8 OTHER SCHIZOAFFECTIVE DISORDERS (HCC): Primary | ICD-10-CM

## 2025-02-12 PROCEDURE — 99213 OFFICE O/P EST LOW 20 MIN: CPT | Performed by: FAMILY MEDICINE

## 2025-02-12 RX ORDER — QUETIAPINE 300 MG/1
600 TABLET, FILM COATED, EXTENDED RELEASE ORAL
Qty: 60 TABLET | Refills: 1 | Status: SHIPPED | OUTPATIENT
Start: 2025-02-12

## 2025-02-12 RX ORDER — ASPIRIN 81 MG/1
81 TABLET, COATED ORAL DAILY
Qty: 90 TABLET | Refills: 3 | Status: SHIPPED | OUTPATIENT
Start: 2025-02-12

## 2025-02-12 NOTE — ASSESSMENT & PLAN NOTE
-Formerly [employer] graduate and now manages his own medication  -Receives help from a friend managing his medication; today states uses 300mg seroquel once daily  -Previously plan made with help of psychiatry and behaviouralist  -Has had some mild AH however no VH and no SI/HI    PLAN  -Continue with previous plan documented thoroughly from my visit from 12/6/2023 namely seroquel 300mg increased to seroquel 600mg ER at bedtime   -f/u in one month     Orders:    QUEtiapine (SEROquel XR) 300 mg 24 hr tablet; Take 2 tablets (600 mg total) by mouth daily at bedtime

## 2025-02-12 NOTE — ASSESSMENT & PLAN NOTE
-refilled his aspirin and recommended he f/u with neurology; please refer to my note from 11/14/2023    Orders:    Aspirin Low Dose 81 MG EC tablet; Take 1 tablet (81 mg total) by mouth daily    Ambulatory Referral to Neurology; Future

## 2025-02-12 NOTE — PROGRESS NOTES
"Name: Calderon Alvarado      : 1970      MRN: [id number]  Encounter Provider: Rebecca Fay Kab-Perlman, MD  Encounter Date: 2025   Encounter department: [facility]  :  Assessment & Plan  Other schizoaffective disorders (HCC)  -Formerly [employer] graduate and now manages his own medication  -Receives help from a friend managing his medication; today states uses 300mg seroquel once daily  -Previously plan made with help of psychiatry and behaviouralist  -Has had some mild AH however no VH and no SI/HI    PLAN  -Continue with previous plan documented thoroughly from my visit from 2023 namely seroquel 300mg increased to seroquel 600mg ER at bedtime   -f/u in one month     Orders:    QUEtiapine (SEROquel XR) 300 mg 24 hr tablet; Take 2 tablets (600 mg total) by mouth daily at bedtime    History of stroke  -refilled his aspirin and recommended he f/u with neurology; please refer to my note from 2023    Orders:    Aspirin Low Dose 81 MG EC tablet; Take 1 tablet (81 mg total) by mouth daily    Ambulatory Referral to Neurology; Future           History of Present Illness     Interpretor used ID # [id number]   Calderon Alvarado is a 53 yo male patient presenting today to f/u regarding his schizoaffective disorder. He is currently on seroquel 300mg BID. Prior plan was to change to 600mg IR at bedtime per my discussion with pshychiatry. Please refer to my note and plan from 12/3/2023.    No VH however does have auditory hallucinations.     Graduated from [employer]" and has been there for two years now. His friend helps with medications. Voices yesterday were telling him to leave the program, that he doesn't have family here but \"I don't want to go.\" Yesterday was the first time he had the voices in three weeks. Last used cocaine 4 years ago. The voices do not tell him to harm himself or anyone else and he has no suicidal or homicidal ideations    Review of Systems " "  Constitutional:  Negative for fever.   Cardiovascular:  Negative for chest pain and palpitations.   Skin:  Negative for rash.   Psychiatric/Behavioral:  Positive for hallucinations. Negative for behavioral problems, dysphoric mood, self-injury and suicidal ideas. The patient is not nervous/anxious.        Objective   /70 (BP Location: Right arm, Patient Position: Sitting, Cuff Size: Large)   Pulse 94   Temp 98.6 °F (37 °C) (Temporal)   Resp 18   Ht 5' 9\" (1.753 m)   Wt 93 kg (205 lb)   SpO2 97%   BMI 30.27 kg/m²      Physical Exam  Constitutional:       Appearance: Normal appearance.   HENT:      Head: Normocephalic and atraumatic.   Cardiovascular:      Rate and Rhythm: Normal rate and regular rhythm.      Heart sounds: Normal heart sounds. No murmur heard.     No friction rub. No gallop.   Pulmonary:      Breath sounds: Normal breath sounds. No wheezing, rhonchi or rales.   Musculoskeletal:         General: Normal range of motion.   Skin:     General: Skin is warm.   Neurological:      General: No focal deficit present.      Mental Status: He is alert and oriented to person, place, and time.      Cranial Nerves: No cranial nerve deficit.      Motor: No weakness (mild weakness of right leg).   Psychiatric:         Mood and Affect: Mood normal.         Behavior: Behavior normal.         "

## 2025-03-07 ENCOUNTER — TELEPHONE (OUTPATIENT)
Dept: FAMILY MEDICINE CLINIC | Facility: CLINIC | Age: 55
End: 2025-03-07

## 2025-03-07 NOTE — TELEPHONE ENCOUNTER
Message from Marsha ([employer]) letting provider know that patient is interested in smoking cessation. If possible, please prescribe medication at next office visit or send to pharmacy. Thanks.

## 2025-05-19 ENCOUNTER — TELEPHONE (OUTPATIENT)
Dept: FAMILY MEDICINE CLINIC | Facility: CLINIC | Age: 55
End: 2025-05-19

## 2025-05-19 NOTE — TELEPHONE ENCOUNTER
Pt still has 1 refill available of both of these prescription at  Pharmacy.     1st attempt, Unable to reach. Left vm informing they should reach out to their pharmacy directly for the refill     Please inform if they call back     Pt has appt on 5/20/25

## 2025-05-19 NOTE — TELEPHONE ENCOUNTER
Patient is requesting this medications to be refill:      escitalopram (LEXAPRO) 20 mg tablet      Aspirin Low Dose 81 MG EC tablet

## 2025-06-12 ENCOUNTER — OFFICE VISIT (OUTPATIENT)
Dept: FAMILY MEDICINE CLINIC | Facility: CLINIC | Age: 55
End: 2025-06-12

## 2025-06-12 VITALS
HEIGHT: 69 IN | BODY MASS INDEX: 30.93 KG/M2 | DIASTOLIC BLOOD PRESSURE: 70 MMHG | SYSTOLIC BLOOD PRESSURE: 120 MMHG | TEMPERATURE: 97 F | OXYGEN SATURATION: 96 % | HEART RATE: 73 BPM | WEIGHT: 208.8 LBS | RESPIRATION RATE: 16 BRPM

## 2025-06-12 DIAGNOSIS — R51.9 NONINTRACTABLE EPISODIC HEADACHE, UNSPECIFIED HEADACHE TYPE: ICD-10-CM

## 2025-06-12 PROCEDURE — 99213 OFFICE O/P EST LOW 20 MIN: CPT

## 2025-06-12 RX ORDER — IBUPROFEN 600 MG/1
600 TABLET, FILM COATED ORAL EVERY 8 HOURS PRN
Qty: 30 TABLET | Refills: 0 | Status: SHIPPED | OUTPATIENT
Start: 2025-06-12

## 2025-06-12 NOTE — PROGRESS NOTES
Name: Calderon Alvarado      : 1970      MRN: [id number]  Encounter Provider: ROHINI Johns  Encounter Date: 2025   Encounter department: [facility]  :  Assessment & Plan  Nonintractable episodic headache, unspecified headache type  - on doing headache diary   -Neuro WNL  -Take tylenol/ibuprofen PRN  -Instruct nonpharmacologic modalities such as regular exercises, increasing fluid intake, healthy balance diet, heat and cold packs, good sleep hygiene, and avoiding  triggers.    -Discuss to f/u in the clinic if symptoms persists/worsens   Orders:    ibuprofen (MOTRIN) 600 mg tablet; Take 1 tablet (600 mg total) by mouth every 8 (eight) hours as needed for mild pain        BMI Counseling: Body mass index is 30.83 kg/m². The BMI is above normal. Nutrition recommendations include decreasing portion sizes, encouraging healthy choices of fruits and vegetables, decreasing fast food intake, consuming healthier snacks, limiting drinks that contain sugar, moderation in carbohydrate intake, increasing intake of lean protein, reducing intake of saturated and trans fat and reducing intake of cholesterol. Exercise recommendations include exercising 3-5 times per week. No pharmacotherapy was ordered. Patient referred to PCP. Rationale for BMI follow-up plan is due to patient being overweight or obese.       History of Present Illness   Calderon Alvarado is a 55 y.o. 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     Patient is here for headache. Episode onset: 2 weeks. Episode frequency: 3 times weekly and last 15 minutes. The problem is unchanged. Pain location: crown of the head. The pain does not  "radiate. Quality: pressure. The pain is at a severity of 8/10. Associated symptoms include photophobia. Pertinent negatives include no abdominal pain, back pain, coughing, diarrhea, dizziness, ear pain, eye pain, fever, hearing loss, insomnia, loss of balance, nausea, numbness, phonophobia, seizures, sore throat, tinnitus, vomiting or weakness. Nothing aggravates the symptoms. Past treatments include acetaminophen. The treatment provided no relief.       Review of Systems   Constitutional: Negative.  Negative for chills.   HENT: Negative.     Eyes:  Positive for photophobia. Negative for visual disturbance.   Respiratory: Negative.  Negative for shortness of breath.    Cardiovascular: Negative.  Negative for chest pain and palpitations.   Gastrointestinal: Negative.    Genitourinary: Negative.  Negative for dysuria and hematuria.   Musculoskeletal: Negative.  Negative for arthralgias.   Skin: Negative.  Negative for color change and rash.   Neurological:  Positive for headaches. Negative for syncope.   Psychiatric/Behavioral: Negative.  Negative for behavioral problems.    All other systems reviewed and are negative.      Objective   /70 (BP Location: Left arm, Patient Position: Sitting, Cuff Size: Standard)   Pulse 73   Temp (!) 97 °F (36.1 °C) (Temporal)   Resp 16   Ht 5' 9\" (1.753 m)   Wt 94.7 kg (208 lb 12.8 oz)   SpO2 96%   BMI 30.83 kg/m²      Physical Exam  Vitals and nursing note reviewed.   Constitutional:       General: He is not in acute distress.     Appearance: Normal appearance. He is well-developed. He is obese.   HENT:      Head: Normocephalic and atraumatic.      Right Ear: Tympanic membrane normal.      Left Ear: Tympanic membrane normal.      Nose: Nose normal.      Mouth/Throat:      Mouth: Mucous membranes are moist.      Pharynx: Oropharynx is clear.     Eyes:      Conjunctiva/sclera: Conjunctivae normal.       Cardiovascular:      Rate and Rhythm: Normal rate and regular rhythm. "      Pulses: Normal pulses.      Heart sounds: Normal heart sounds. No murmur heard.  Pulmonary:      Effort: Pulmonary effort is normal. No respiratory distress.      Breath sounds: Normal breath sounds.   Abdominal:      General: Abdomen is flat. Bowel sounds are normal.      Palpations: Abdomen is soft.      Tenderness: There is no abdominal tenderness.     Musculoskeletal:         General: No swelling. Normal range of motion.      Cervical back: Normal range of motion and neck supple.     Skin:     General: Skin is warm and dry.      Capillary Refill: Capillary refill takes less than 2 seconds.     Neurological:      General: No focal deficit present.      Mental Status: He is alert and oriented to person, place, and time. Mental status is at baseline.      GCS: GCS eye subscore is 4. GCS verbal subscore is 5. GCS motor subscore is 6.      Cranial Nerves: Cranial nerves 2-12 are intact.      Sensory: Sensation is intact.      Motor: Motor function is intact.      Coordination: Coordination is intact.     Psychiatric:         Mood and Affect: Mood normal.         Behavior: Behavior normal.         Thought Content: Thought content normal.         Judgment: Judgment normal.

## 2025-06-25 DIAGNOSIS — F25.8 OTHER SCHIZOAFFECTIVE DISORDERS (HCC): ICD-10-CM

## 2025-06-25 RX ORDER — ESCITALOPRAM OXALATE 20 MG/1
20 TABLET ORAL DAILY
Qty: 90 TABLET | Refills: 1 | Status: SHIPPED | OUTPATIENT
Start: 2025-06-25

## 2025-07-11 ENCOUNTER — OFFICE VISIT (OUTPATIENT)
Dept: FAMILY MEDICINE CLINIC | Facility: CLINIC | Age: 55
End: 2025-07-11

## 2025-07-11 VITALS
WEIGHT: 200 LBS | BODY MASS INDEX: 29.62 KG/M2 | OXYGEN SATURATION: 98 % | DIASTOLIC BLOOD PRESSURE: 84 MMHG | TEMPERATURE: 97.9 F | HEART RATE: 68 BPM | HEIGHT: 69 IN | SYSTOLIC BLOOD PRESSURE: 132 MMHG | RESPIRATION RATE: 18 BRPM

## 2025-07-11 DIAGNOSIS — M54.50 ACUTE MIDLINE LOW BACK PAIN WITHOUT SCIATICA: Primary | ICD-10-CM

## 2025-07-11 RX ORDER — METHOCARBAMOL 500 MG/1
500 TABLET, FILM COATED ORAL 2 TIMES DAILY
Qty: 30 TABLET | Refills: 0 | Status: SHIPPED | OUTPATIENT
Start: 2025-07-11 | End: 2025-07-26

## 2025-07-11 RX ORDER — NAPROXEN 500 MG/1
500 TABLET ORAL 2 TIMES DAILY WITH MEALS
Qty: 60 TABLET | Refills: 0 | Status: SHIPPED | OUTPATIENT
Start: 2025-07-11

## 2025-07-11 NOTE — PROGRESS NOTES
Name: Calderon Alvarado      : 1970      MRN: [id number]  Encounter Provider: Nash Gagnon MD  Encounter Date: 2025   Encounter department: [facility]  :  Assessment & Plan  Acute midline low back pain without sciatica  High likelihood that it is musculoskeletal in nature based on the history and physical exam.  No alarm symptoms at this time such as recent fevers, night sweats, saddle anesthesia, bowel or bladder dysfunction, or lower extremity weakness.     Plan:  - Will try a trial of NSAIDs and muscle relaxant  - Advised patient to exercise  - Advised patient to use warm compresses on affected area as needed.   - Provided the patient with handout about back exercises and stretching.  - Provided patient with warning signs of worsening back pain, at which time to report immediately to ER or our office.   - Consider PT referral and imaging if Sx gets worse  Orders:  •  naproxen (Naprosyn) 500 mg tablet; Take 1 tablet (500 mg total) by mouth 2 (two) times a day with meals  •  methocarbamol (ROBAXIN) 500 mg tablet; Take 1 tablet (500 mg total) by mouth in the morning and 1 tablet (500 mg total) before bedtime. Do all this for 15 days.           History of Present Illness {?Quick Links Encounters * My Last Note * Last Note in Specialty * Snapshot * Since Last Visit * History :47614}  Tiempy  [id number] used:    This is a 56 yo male came to the clinic with a c/o back pain that started yesterday morning. The pain is  more when he sits down and it is 8/10. It feels better when he stands up and start walking.  Denies fever, no IVD use, no bowel/urinary incontinence. No other complaints.    Back Pain  This is a new problem. The current episode started yesterday. Pertinent negatives include no abdominal pain, chest pain, dysuria or fever.     Review of Systems   Constitutional:  Negative for chills and fever.   HENT:  Negative for ear pain and sore  "throat.    Eyes:  Negative for pain and visual disturbance.   Respiratory:  Negative for cough and shortness of breath.    Cardiovascular:  Negative for chest pain and palpitations.   Gastrointestinal:  Negative for abdominal pain and vomiting.   Genitourinary:  Negative for dysuria and hematuria.   Musculoskeletal:  Positive for back pain. Negative for arthralgias.   Skin:  Negative for color change and rash.   Neurological:  Negative for seizures and syncope.   All other systems reviewed and are negative.      Objective {?Quick Links Trend Vitals * Enter New Vitals * Results Review * Timeline (Adult) * Labs * Imaging * Cardiology * Procedures * Lung Cancer Screening * Surgical eConsent :67503}  /84 (BP Location: Right arm, Patient Position: Sitting, Cuff Size: Standard)   Pulse 68   Temp 97.9 °F (36.6 °C) (Temporal)   Resp 18   Ht 5' 9\" (1.753 m)   Wt 90.7 kg (200 lb)   SpO2 98%   BMI 29.53 kg/m²      Physical Exam  Vitals and nursing note reviewed.   Constitutional:       General: He is not in acute distress.     Appearance: He is well-developed.   HENT:      Head: Normocephalic and atraumatic.     Eyes:      Conjunctiva/sclera: Conjunctivae normal.       Cardiovascular:      Rate and Rhythm: Normal rate and regular rhythm.      Heart sounds: No murmur heard.  Pulmonary:      Effort: Pulmonary effort is normal. No respiratory distress.      Breath sounds: Normal breath sounds.   Abdominal:      Palpations: Abdomen is soft.      Tenderness: There is no abdominal tenderness. There is no right CVA tenderness or left CVA tenderness.     Musculoskeletal:         General: Tenderness (mild tenderness on the lower midline back) present. No swelling or signs of injury.      Cervical back: Neck supple.     Skin:     General: Skin is warm and dry.      Capillary Refill: Capillary refill takes less than 2 seconds.     Neurological:      General: No focal deficit present.      Mental Status: He is alert and " oriented to person, place, and time.      Sensory: No sensory deficit.      Motor: No weakness.      Gait: Gait normal.     Psychiatric:         Behavior: Behavior normal.

## 2025-07-11 NOTE — PATIENT INSTRUCTIONS
"Patient Education     Lumbago - Instrucciones para el marito   Conceptos Básicos   Redactado por los médicos y editores de Up[employer]   ¿Qué son las instrucciones para el marito? -- Las instrucciones para el marito son información sobre cómo cuidarse después de recibir atención médica por un problema de bri.  ¿Qué es el lumbago? -- El lumbago es sentir dolor o molestia en la parte baja de la espalda. Muchas personas tienen lumbago en algún momento, y la mayoría de las veces mejora por sí solo. El dolor en la parte baja de la espalda puede deberse a muchas cosas. La mayoría de las veces, los médicos desconocen la causa exacta.  Un desgarro en un músculo puede causar dolor de espalda. Con frecuencia, esto es lo que ocurre cuando alguien dice que sintió un \"tirón\" en la espalda y se refiere al dolor que comienza súbitamente después de hacer ayana actividad física, parker levantar un objeto pesado o flexionar la espalda.  El dolor de espalda también puede aparecer si usted tiene (figura 1):   Discos dañados, protuberantes o lesionados   Artritis que afecta las articulaciones de la columna   Crecimientos óseos en las vértebras que comprimen los nervios cercanos   Ayana “fractura por aplastamiento” debida a la osteoporosis (padecimiento que debilita los huesos)   Vértebras fuera de lugar   Estrechamiento del conducto raquídeo   Un tumor o ayana infección (aunque es muy poco frecuente)  ¿Cómo puedo cuidarme en casa? -- Pregúntele al médico o enfermero qué debe hacer cuando vuelva a mcnamara casa. Asegúrese de comprender exactamente lo que tiene que hacer para cuidarse. Curtis preguntas si hay algo que no entiende.  También debe hacer lo siguiente:   Aplique calor en la espalda maty períodos cortos, si ayuda con el dolor. Ponga ayana almohadilla térmica (en la configuración de intensidad más baja) en mcnamara espalda maty 20 minutos a la vez varias veces al día. Nunca se vaya a dormir con la almohadilla térmica en la espalda.   Trate de mantenerse " "lo más activo posible sin causar demasiado dolor, si mcnamara médico o enfermero le dijo que puede hacerlo. Si el dolor es yanira, posiblemente deba reposar 1 o 2 días. Sin embargo, es importante que vuelva a caminar y moverse lo antes posible. Trate de seguir haciendo sixto actividades diarias normales. Levántese y muévase con cuidado maty el día a medida que pueda.   Poco a poco comience a aumentar mcnamara nivel de actividad a medida que pueda. Si algo hace que mcnamara dolor regrese o empeore, deje de hacerlo y vuelva a hacer las actividades más fáciles que no le dolían.   Evite estar parado o sentado en la misma posición sin moverse maty mucho tiempo Puede dormir con ayana almohada debajo de las rodillas o entre estas si le lorena el dolor.   [place] medicinas parker ibuprofeno (ejemplos de marcas comerciales: Advil, Motrin) o naproxeno (marissa comercial: Aleve) para el dolor, si es necesario. Son medicinas antiinflamatorias no esteroides (\"MODESTA\"). Podrían ser más efectivas que el paracetamol (acetaminofén) para el lumbago.   Hable con mcnamara médico o enfermero antes de probar cualquiera de las siguientes opciones. Estos tratamientos podrían ayudar a que se sienta mejor maty algún tiempo:   Manipulación de la columna - Es un tratamiento que realiza un quiropráctico, un fisioterapeuta u otro profesional al  o \"acomodar\" las articulaciones de mcnamara espalda.   Acupuntura - Es un tratamiento que realiza un experto en medicina china tradicional, mediante el cual se insertan pequeñas agujas en la piel para bloquear las señales de dolor.   Terapia de masaje - El masajista manipula los músculos y partes blandas para disminuir la tensión muscular y aumentar la relajación.  ¿Qué atención de seguimiento necesito? -- Mcnamara médico o enfermero le dirá si necesita programar ayana consulta de seguimiento. Si es así, asegúrese de saber cuándo y adónde ir. El médico podría sugerir que consulte a un fisioterapeuta para aprender ejercicios que lo ayuden " con el dolor de espalda.  ¿Cuándo katia llamar al médico? -- Pida ayuda de emergencia de inmediato (en . [employer] [place], llame al 9-1-1) si:   No puede caminar, o no puede controlar los intestinos o la vejiga.   Tiene fiebre de 100.4 °F (38 °C) o más, escalofríos o sudores nocturnos.  Llame a mcnamara médico para pedir consejo si:   Tiene adormecimiento, debilidad u hormigueo en las piernas.   El dolor empeora, incluso con medicinas y descanso.   Le sale un sarpullido.  Todos los artículos se actualizan a medida que se descubre nueva evidencia y culmina nuestro proceso de evaluación por homólogos   El artículo se recuperó de UpToDate el: May 15, 2024.  Artículo [id number] Versión [id number].es-419.1  Release: 32.4.3 - C32.134  © 2024 [employer]. Todos los derechos reservados.  figura 1: Anatomía de la espalda     En el dibujo se muestran las distintas partes de la espalda. El dolor en la espalda puede aparecer a causa de problemas en los músculos, ligamentos, discos, huesos (vértebras) o nervios.  Gráfico [id number] Versión 6.0  Exención de responsabilidad y uso de la información del consumidor   Descargo de responsabilidad: esta información generalizada es un resumen limitado de información sobre el diagnóstico, el tratamiento y/o los medicamentos. No pretende ser exhaustiva y se debe utilizar parker herramienta para ayudar al usuario a comprender y/o evaluar las posibles opciones de diagnóstico y tratamiento. No incluye toda la información sobre afecciones, tratamientos, medicamentos, efectos secundarios o riesgos puedan ser aplicables a un paciente específico. No tiene el propósito de servir parker recomendación médica ni de sustituir la recomendación médica, el diagnóstico o el tratamiento de un profesional de atención médica que se base en el examen y la evaluación de el profesional de la bri respecto a las circunstancias específicas y únicas del paciente. Los pacientes deben hablar con un profesional de atención médica para  obtener información completa sobre mcnamara bri, cuestiones médicas y opciones de tratamiento, incluidos los riesgos o los beneficios relacionados con el uso de medicamentos. Esta información no certifica que los tratamientos o medicamentos maida seguros, eficaces o estén aprobados para tratar a un paciente específico. [employer] y sixto afiliados renuncian a cualquier garantía o responsabilidad relacionada con esta información o el uso de la misma.El uso de esta información está sujeto a las Condiciones de uso, disponibles en [email or web address]. 2024© [employer] y sixto afiliados y/o licenciantes. Todos los derechos reservados.  Copyright   © 2024 [employer] Todos los derechos reservados.